# Patient Record
Sex: FEMALE | Race: WHITE | HISPANIC OR LATINO | Employment: FULL TIME | URBAN - METROPOLITAN AREA
[De-identification: names, ages, dates, MRNs, and addresses within clinical notes are randomized per-mention and may not be internally consistent; named-entity substitution may affect disease eponyms.]

---

## 2023-06-20 LAB
ERYTHROCYTE DISTRIBUTION WIDTH (RATIO) BY AUTOMATED COUNT: 14 % (ref 11.5–14.5)
ERYTHROCYTE MEAN CORPUSCULAR HEMOGLOBIN CONCENTRATION (G/DL) BY AUTOMATED: 31.3 G/DL (ref 32–36)
ERYTHROCYTE MEAN CORPUSCULAR VOLUME (FL) BY AUTOMATED COUNT: 90 FL (ref 80–100)
ERYTHROCYTES (10*6/UL) IN BLOOD BY AUTOMATED COUNT: 4.21 X10E12/L (ref 4–5.2)
GLUCOSE, 1 HR SCREEN, PREG: 109 MG/DL
HEMATOCRIT (%) IN BLOOD BY AUTOMATED COUNT: 38 % (ref 36–46)
HEMOGLOBIN (G/DL) IN BLOOD: 11.9 G/DL (ref 12–16)
LEUKOCYTES (10*3/UL) IN BLOOD BY AUTOMATED COUNT: 9.2 X10E9/L (ref 4.4–11.3)
PLATELETS (10*3/UL) IN BLOOD AUTOMATED COUNT: 210 X10E9/L (ref 150–450)
REFLEX ADDED, ANEMIA PANEL: ABNORMAL

## 2023-07-08 ENCOUNTER — HOSPITAL ENCOUNTER (OUTPATIENT)
Dept: DATA CONVERSION | Facility: HOSPITAL | Age: 33
End: 2023-07-08
Attending: OBSTETRICS & GYNECOLOGY
Payer: COMMERCIAL

## 2023-07-08 DIAGNOSIS — O99.891 OTHER SPECIFIED DISEASES AND CONDITIONS COMPLICATING PREGNANCY (HHS-HCC): ICD-10-CM

## 2023-07-08 DIAGNOSIS — O26.893 OTHER SPECIFIED PREGNANCY RELATED CONDITIONS, THIRD TRIMESTER (HHS-HCC): ICD-10-CM

## 2023-07-08 DIAGNOSIS — M79.10 MYALGIA, UNSPECIFIED SITE: ICD-10-CM

## 2023-07-08 DIAGNOSIS — Z3A.32 32 WEEKS GESTATION OF PREGNANCY (HHS-HCC): ICD-10-CM

## 2023-07-08 DIAGNOSIS — R51.9 HEADACHE, UNSPECIFIED: ICD-10-CM

## 2023-08-04 LAB — GROUP B STREP SCREEN: NORMAL

## 2023-09-28 PROBLEM — M79.10 MYALGIA: Status: ACTIVE | Noted: 2023-09-28

## 2023-09-28 PROBLEM — M99.04 SEGMENTAL AND SOMATIC DYSFUNCTION OF SACRAL REGION: Status: ACTIVE | Noted: 2023-09-28

## 2023-09-28 PROBLEM — M53.3 SACRAL BACK PAIN: Status: ACTIVE | Noted: 2023-09-28

## 2023-09-28 PROBLEM — M99.02 SEGMENTAL AND SOMATIC DYSFUNCTION OF THORACIC REGION: Status: ACTIVE | Noted: 2023-09-28

## 2023-09-28 PROBLEM — O99.891 BACK PAIN IN PREGNANCY (HHS-HCC): Status: ACTIVE | Noted: 2023-09-28

## 2023-09-28 PROBLEM — R35.0 URINARY FREQUENCY: Status: ACTIVE | Noted: 2023-09-28

## 2023-09-28 PROBLEM — M54.9 BACK PAIN IN PREGNANCY (HHS-HCC): Status: ACTIVE | Noted: 2023-09-28

## 2023-09-28 PROBLEM — M99.03 SEGMENTAL AND SOMATIC DYSFUNCTION OF LUMBAR REGION: Status: ACTIVE | Noted: 2023-09-28

## 2023-09-28 PROBLEM — Z20.2 POSSIBLE EXPOSURE TO STD: Status: ACTIVE | Noted: 2023-09-28

## 2023-09-28 PROBLEM — E03.9 HYPOTHYROIDISM: Status: ACTIVE | Noted: 2023-09-28

## 2023-09-28 PROBLEM — M99.05 SEGMENTAL AND SOMATIC DYSFUNCTION OF PELVIC REGION: Status: ACTIVE | Noted: 2023-09-28

## 2023-09-28 PROBLEM — M99.01 SEGMENTAL AND SOMATIC DYSFUNCTION OF CERVICAL REGION: Status: ACTIVE | Noted: 2023-09-28

## 2023-09-28 PROBLEM — M79.9 POSTURAL STRAIN: Status: ACTIVE | Noted: 2023-09-28

## 2023-09-28 PROBLEM — F32.A DEPRESSION: Status: ACTIVE | Noted: 2023-09-28

## 2023-09-28 RX ORDER — LEVOTHYROXINE SODIUM 88 UG/1
88 TABLET ORAL DAILY
COMMUNITY
Start: 2022-12-07

## 2023-09-28 RX ORDER — LEVOTHYROXINE SODIUM 125 UG/1
125 TABLET ORAL DAILY
COMMUNITY
Start: 2023-05-13 | End: 2023-10-02 | Stop reason: ALTCHOICE

## 2023-09-28 RX ORDER — VIT C/E/ZN/COPPR/LUTEIN/ZEAXAN 250MG-90MG
25 CAPSULE ORAL DAILY
COMMUNITY
End: 2024-01-10 | Stop reason: WASHOUT

## 2023-09-28 RX ORDER — ACETAMINOPHEN 325 MG/1
975 TABLET ORAL EVERY 6 HOURS
COMMUNITY
Start: 2021-05-27 | End: 2023-10-02 | Stop reason: HOSPADM

## 2023-09-28 RX ORDER — ASCORBIC ACID 500 MG
TABLET,CHEWABLE ORAL
COMMUNITY
End: 2024-01-10 | Stop reason: WASHOUT

## 2023-09-28 RX ORDER — DOCUSATE SODIUM 100 MG/1
100 CAPSULE, LIQUID FILLED ORAL 2 TIMES DAILY
COMMUNITY
Start: 2021-05-27 | End: 2023-10-02 | Stop reason: ENTERED-IN-ERROR

## 2023-09-28 RX ORDER — LEVOTHYROXINE SODIUM 75 UG/1
1 TABLET ORAL DAILY
COMMUNITY
End: 2023-10-02 | Stop reason: ALTCHOICE

## 2023-09-28 RX ORDER — LEVOTHYROXINE SODIUM 75 UG/1
0.5 TABLET ORAL
COMMUNITY
Start: 2020-10-13 | End: 2023-10-02 | Stop reason: ALTCHOICE

## 2023-09-28 RX ORDER — IBUPROFEN 600 MG/1
600 TABLET ORAL EVERY 6 HOURS
COMMUNITY
Start: 2021-05-27 | End: 2023-10-02 | Stop reason: HOSPADM

## 2023-09-29 VITALS — BODY MASS INDEX: 40.51 KG/M2 | HEIGHT: 63 IN | WEIGHT: 228.62 LBS

## 2023-10-02 ENCOUNTER — OFFICE VISIT (OUTPATIENT)
Dept: OBSTETRICS AND GYNECOLOGY | Facility: CLINIC | Age: 33
End: 2023-10-02
Payer: COMMERCIAL

## 2023-10-02 VITALS
WEIGHT: 205 LBS | DIASTOLIC BLOOD PRESSURE: 70 MMHG | HEIGHT: 64 IN | SYSTOLIC BLOOD PRESSURE: 122 MMHG | BODY MASS INDEX: 35 KG/M2

## 2023-10-02 PROBLEM — O99.891 BACK PAIN IN PREGNANCY (HHS-HCC): Status: RESOLVED | Noted: 2023-09-28 | Resolved: 2023-10-02

## 2023-10-02 PROBLEM — M54.9 BACK PAIN IN PREGNANCY (HHS-HCC): Status: RESOLVED | Noted: 2023-09-28 | Resolved: 2023-10-02

## 2023-10-02 PROCEDURE — 88141 CYTOPATH C/V INTERPRET: CPT | Performed by: PATHOLOGY

## 2023-10-02 PROCEDURE — 0503F POSTPARTUM CARE VISIT: CPT | Performed by: ADVANCED PRACTICE MIDWIFE

## 2023-10-02 PROCEDURE — 88175 CYTOPATH C/V AUTO FLUID REDO: CPT

## 2023-10-02 PROCEDURE — 1036F TOBACCO NON-USER: CPT | Performed by: ADVANCED PRACTICE MIDWIFE

## 2023-10-02 ASSESSMENT — ENCOUNTER SYMPTOMS
RESPIRATORY NEGATIVE: 0
EYES NEGATIVE: 0
PSYCHIATRIC NEGATIVE: 0
GASTROINTESTINAL NEGATIVE: 0
HEMATOLOGIC/LYMPHATIC NEGATIVE: 0
MUSCULOSKELETAL NEGATIVE: 0
CONSTITUTIONAL NEGATIVE: 0
NEUROLOGICAL NEGATIVE: 0
ENDOCRINE NEGATIVE: 0
ALLERGIC/IMMUNOLOGIC NEGATIVE: 0
CARDIOVASCULAR NEGATIVE: 0

## 2023-10-02 NOTE — PROGRESS NOTES
----- Message from [place]ana [place] sent at 3/29/2023 10:09 AM CDT -----  Regarding: Sooner Appt Request  Who Is Calling : FRANKEL, ELIZABETH [[id number]]        Reason For The Call: Patient is requesting a sooner appointment.  Patient declined first available and does not want to be added to the waitlist.  Please contact the patient to schedule.        Preferred Contact Method: [phone]        Additional Information: Patient believes she has an UTI. First available was 5/24     Postpartum Progress Note    Assessment/Plan   Liliam Vargas is a 33 y.o.,  who presents for a 6 wk PP exam after a successful  2023. Breast feeding and supplementing with formula. Lochia gone, no period yet. Had intercourse without any concerns. Undecided on contraception, does not want hormones.     Active Problems:  There are no active Hospital Problems.    Problem List       No episode was linked to this visit.          Hospital course: gestational hypertension       Subjective   She reports no breast or nursing problems  She denies emotional concerns today   Her plan for contraception is IUD, periodic abstinence       Objective   Allergies:   Patient has no known allergies.         Last Vitals:  Temp Pulse Resp BP MAP Pulse Ox         122/70             Physical Exam:  General: Examination reveals a well developed, well nourished, female, in no acute distress. She is alert and cooperative.    Lab Data:  Labs in chart were reviewed.

## 2023-10-18 LAB
CYTOLOGY CMNT CVX/VAG CYTO-IMP: NORMAL
HPV HR GENOTYPES PNL CVX NAA+PROBE: NEGATIVE
HPV HR GENOTYPES PNL CVX NAA+PROBE: NEGATIVE
HPV16 DNA SPEC QL NAA+PROBE: NEGATIVE
HPV18 DNA SPEC QL NAA+PROBE: NEGATIVE
LAB AP HPV GENOTYPE QUESTION: YES
LAB AP HPV HR: NORMAL
LAB AP PREVIOUS ABNORMAL HISTORY: NORMAL
LABORATORY COMMENT REPORT: NORMAL
MENSTRUAL HX REPORTED: NORMAL
PATH REPORT.TOTAL CANCER: NORMAL

## 2024-01-10 ENCOUNTER — OFFICE VISIT (OUTPATIENT)
Dept: PRIMARY CARE | Facility: CLINIC | Age: 34
End: 2024-01-10
Payer: COMMERCIAL

## 2024-01-10 VITALS
BODY MASS INDEX: 35 KG/M2 | HEIGHT: 64 IN | TEMPERATURE: 97.4 F | WEIGHT: 205 LBS | HEART RATE: 68 BPM | OXYGEN SATURATION: 97 % | SYSTOLIC BLOOD PRESSURE: 108 MMHG | DIASTOLIC BLOOD PRESSURE: 68 MMHG

## 2024-01-10 DIAGNOSIS — E03.9 HYPOTHYROIDISM, UNSPECIFIED TYPE: Primary | ICD-10-CM

## 2024-01-10 DIAGNOSIS — R74.8 ELEVATED ALKALINE PHOSPHATASE LEVEL: ICD-10-CM

## 2024-01-10 DIAGNOSIS — E28.2 PCOS (POLYCYSTIC OVARIAN SYNDROME): ICD-10-CM

## 2024-01-10 DIAGNOSIS — Z13.220 SCREENING, LIPID: ICD-10-CM

## 2024-01-10 DIAGNOSIS — H66.91 OTITIS OF RIGHT EAR: ICD-10-CM

## 2024-01-10 PROBLEM — E04.9 GOITER: Status: ACTIVE | Noted: 2024-01-10

## 2024-01-10 PROBLEM — R35.0 URINARY FREQUENCY: Status: RESOLVED | Noted: 2023-09-28 | Resolved: 2024-01-10

## 2024-01-10 PROBLEM — M99.04 SEGMENTAL AND SOMATIC DYSFUNCTION OF SACRAL REGION: Status: RESOLVED | Noted: 2023-09-28 | Resolved: 2024-01-10

## 2024-01-10 PROBLEM — M79.10 MYALGIA: Status: RESOLVED | Noted: 2023-09-28 | Resolved: 2024-01-10

## 2024-01-10 PROBLEM — M99.05 SEGMENTAL AND SOMATIC DYSFUNCTION OF PELVIC REGION: Status: RESOLVED | Noted: 2023-09-28 | Resolved: 2024-01-10

## 2024-01-10 PROBLEM — M99.02 SEGMENTAL AND SOMATIC DYSFUNCTION OF THORACIC REGION: Status: RESOLVED | Noted: 2023-09-28 | Resolved: 2024-01-10

## 2024-01-10 PROBLEM — M99.01 SEGMENTAL AND SOMATIC DYSFUNCTION OF CERVICAL REGION: Status: RESOLVED | Noted: 2023-09-28 | Resolved: 2024-01-10

## 2024-01-10 PROBLEM — M79.9 POSTURAL STRAIN: Status: RESOLVED | Noted: 2023-09-28 | Resolved: 2024-01-10

## 2024-01-10 PROBLEM — F32.A DEPRESSION: Status: RESOLVED | Noted: 2023-09-28 | Resolved: 2024-01-10

## 2024-01-10 PROBLEM — Z20.2 POSSIBLE EXPOSURE TO STD: Status: RESOLVED | Noted: 2023-09-28 | Resolved: 2024-01-10

## 2024-01-10 PROBLEM — M99.03 SEGMENTAL AND SOMATIC DYSFUNCTION OF LUMBAR REGION: Status: RESOLVED | Noted: 2023-09-28 | Resolved: 2024-01-10

## 2024-01-10 PROBLEM — M53.3 SACRAL BACK PAIN: Status: RESOLVED | Noted: 2023-09-28 | Resolved: 2024-01-10

## 2024-01-10 PROCEDURE — 99203 OFFICE O/P NEW LOW 30 MIN: CPT | Performed by: INTERNAL MEDICINE

## 2024-01-10 PROCEDURE — 1036F TOBACCO NON-USER: CPT | Performed by: INTERNAL MEDICINE

## 2024-01-10 RX ORDER — AMOXICILLIN AND CLAVULANATE POTASSIUM 875; 125 MG/1; MG/1
1 TABLET, FILM COATED ORAL 2 TIMES DAILY
Qty: 8 TABLET | Refills: 0 | Status: SHIPPED | OUTPATIENT
Start: 2024-01-10 | End: 2024-01-14

## 2024-01-10 RX ORDER — AMOXICILLIN AND CLAVULANATE POTASSIUM 875; 125 MG/1; MG/1
1 TABLET, FILM COATED ORAL 2 TIMES DAILY
COMMUNITY
Start: 2024-01-02 | End: 2024-01-10 | Stop reason: SDUPTHER

## 2024-01-10 SDOH — HEALTH STABILITY: PHYSICAL HEALTH: ON AVERAGE, HOW MANY MINUTES DO YOU ENGAGE IN EXERCISE AT THIS LEVEL?: 30 MIN

## 2024-01-10 SDOH — ECONOMIC STABILITY: TRANSPORTATION INSECURITY
IN THE PAST 12 MONTHS, HAS LACK OF TRANSPORTATION KEPT YOU FROM MEETINGS, WORK, OR FROM GETTING THINGS NEEDED FOR DAILY LIVING?: NO

## 2024-01-10 SDOH — HEALTH STABILITY: PHYSICAL HEALTH: ON AVERAGE, HOW MANY DAYS PER WEEK DO YOU ENGAGE IN MODERATE TO STRENUOUS EXERCISE (LIKE A BRISK WALK)?: 7 DAYS

## 2024-01-10 SDOH — ECONOMIC STABILITY: TRANSPORTATION INSECURITY
IN THE PAST 12 MONTHS, HAS THE LACK OF TRANSPORTATION KEPT YOU FROM MEDICAL APPOINTMENTS OR FROM GETTING MEDICATIONS?: NO

## 2024-01-10 ASSESSMENT — LIFESTYLE VARIABLES
AUDIT-C TOTAL SCORE: 0
HOW OFTEN DO YOU HAVE A DRINK CONTAINING ALCOHOL: NEVER
SKIP TO QUESTIONS 9-10: 1
HOW MANY STANDARD DRINKS CONTAINING ALCOHOL DO YOU HAVE ON A TYPICAL DAY: PATIENT DOES NOT DRINK
HOW OFTEN DO YOU HAVE SIX OR MORE DRINKS ON ONE OCCASION: NEVER

## 2024-01-10 ASSESSMENT — SOCIAL DETERMINANTS OF HEALTH (SDOH)
WITHIN THE LAST YEAR, HAVE YOU BEEN HUMILIATED OR EMOTIONALLY ABUSED IN OTHER WAYS BY YOUR PARTNER OR EX-PARTNER?: NO
WITHIN THE LAST YEAR, HAVE YOU BEEN KICKED, HIT, SLAPPED, OR OTHERWISE PHYSICALLY HURT BY YOUR PARTNER OR EX-PARTNER?: NO
WITHIN THE LAST YEAR, HAVE YOU BEEN AFRAID OF YOUR PARTNER OR EX-PARTNER?: NO
WITHIN THE LAST YEAR, HAVE TO BEEN RAPED OR FORCED TO HAVE ANY KIND OF SEXUAL ACTIVITY BY YOUR PARTNER OR EX-PARTNER?: NO

## 2024-01-10 ASSESSMENT — PATIENT HEALTH QUESTIONNAIRE - PHQ9
2. FEELING DOWN, DEPRESSED OR HOPELESS: NOT AT ALL
1. LITTLE INTEREST OR PLEASURE IN DOING THINGS: NOT AT ALL
SUM OF ALL RESPONSES TO PHQ9 QUESTIONS 1 & 2: 0

## 2024-01-10 ASSESSMENT — PAIN SCALES - GENERAL: PAINLEVEL: 6

## 2024-01-10 NOTE — PROGRESS NOTES
"Standard Progress Note  Chief Complaint   Patient presents with    New Patient Visit     Pt here for new pt visit to establish with Dr. Jensen.  Pt states \"I have been on an antibiotic for an ear infection and tomorrow is the last day.  My right ear is still clogged.\"       HPI:  Liliam Vargas 33 y.o.   female  Right ear still hurts. Gradual improvement day 9 of antibiotic augmentin. [facility]  Sometimes clear fluid left ear. Not recently  Tubes ears in the past.  Has endocrinologist and gynecologist. Did not have primary before      Patient Active Problem List   Diagnosis    Hypothyroidism    PCOS (polycystic ovarian syndrome)    Goiter     Reviewed social, family and surgical history  Allergies and meds reviewed.           Blood pressure 108/68, pulse 68, temperature 36.3 °C (97.4 °F), height 1.626 m (5' 4\"), weight 93 kg (205 lb), SpO2 97 %, currently breastfeeding.  Body mass index is 35.19 kg/m².          Vital reviewed  Left ear scarring  Right ear scarring and TM cloudy  Throat no exudate  Eyes EOMI  Neck: no cervical/clavicular adenopathy +GOITER  CV: RRR S1 S2 normal. No murmur. No carotid bruit.   Lungs: CTA without wrr. Breath sounds symmetric  Abdomen: normoactive. Soft, nontender. No mass.  Extremities: no pretibial edema  Neuro: speech intact.   Scar lower abdomen, horizontal        Lab Results   Component Value Date    GLUCOSE 160 (H) 08/24/2023    CALCIUM 9.0 08/24/2023     08/24/2023    K 3.8 08/24/2023    CO2 21 08/24/2023     08/24/2023    BUN 9 08/24/2023    CREATININE 0.56 08/24/2023      Lab Results   Component Value Date    WBC 10.3 08/22/2023    HGB 13.3 08/22/2023    HCT 41.3 08/22/2023    MCV 86 08/22/2023     08/22/2023      No results found for: \"CHOL\"  No results found for: \"HDL\"  No results found for: \"LDLCALC\"  No results found for: \"TRIG\"  No components found for: \"CHOLHDL\"    1. Hypothyroidism, unspecified type  Managed by  endocrinologist    2. PCOS " (polycystic ovarian syndrome)  Managed by endocrinologist    3. Elevated alkaline phosphatase level  recheck  - Comprehensive metabolic panel; Future    4. Screening, lipid    - Lipid Panel; Future    5. Otitis of right ear  Extend antibiotic for total 14 days. Rtc if refractory. Also advised to see ENT  - amoxicillin-pot clavulanate (Augmentin) 875-125 mg tablet; Take 1 tablet (875 mg) by mouth 2 times a day for 4 days.  Dispense: 8 tablet; Refill: 0  - Referral to ENT; Future        Current Outpatient Medications on File Prior to Visit   Medication Sig Dispense Refill    levothyroxine (Synthroid, Levoxyl) 88 mcg tablet Take 1 tablet (88 mcg) by mouth once daily.      multivit-min/ferrous fumarate (MULTI VITAMIN ORAL) Take by mouth once daily.      [DISCONTINUED] amoxicillin-pot clavulanate (Augmentin) 875-125 mg tablet Take 1 tablet (875 mg) by mouth twice a day.      [DISCONTINUED] cholecalciferol (Vitamin D-3) 25 MCG (1000 UT) capsule Take 1 capsule (25 mcg) by mouth once daily.      [DISCONTINUED] ascorbic acid (Vitamin C) 500 mg chewable tablet Chew.       No current facility-administered medications on file prior to visit.         Kathy Jensen MD

## 2024-01-12 ENCOUNTER — APPOINTMENT (OUTPATIENT)
Dept: OTOLARYNGOLOGY | Facility: CLINIC | Age: 34
End: 2024-01-12
Payer: COMMERCIAL

## 2024-01-13 ENCOUNTER — LAB (OUTPATIENT)
Dept: LAB | Facility: LAB | Age: 34
End: 2024-01-13
Payer: COMMERCIAL

## 2024-01-13 DIAGNOSIS — R74.8 ELEVATED ALKALINE PHOSPHATASE LEVEL: ICD-10-CM

## 2024-01-13 DIAGNOSIS — Z13.220 SCREENING, LIPID: ICD-10-CM

## 2024-01-13 LAB
ALBUMIN SERPL BCP-MCNC: 4.5 G/DL (ref 3.4–5)
ALP SERPL-CCNC: 91 U/L (ref 33–110)
ALT SERPL W P-5'-P-CCNC: 26 U/L (ref 7–45)
ANION GAP SERPL CALC-SCNC: 11 MMOL/L (ref 10–20)
AST SERPL W P-5'-P-CCNC: 27 U/L (ref 9–39)
BILIRUB SERPL-MCNC: 0.4 MG/DL (ref 0–1.2)
BUN SERPL-MCNC: 15 MG/DL (ref 6–23)
CALCIUM SERPL-MCNC: 9.5 MG/DL (ref 8.6–10.3)
CHLORIDE SERPL-SCNC: 99 MMOL/L (ref 98–107)
CHOLEST SERPL-MCNC: 208 MG/DL (ref 0–199)
CHOLESTEROL/HDL RATIO: 4.5
CO2 SERPL-SCNC: 29 MMOL/L (ref 21–32)
CREAT SERPL-MCNC: 0.67 MG/DL (ref 0.5–1.05)
EGFRCR SERPLBLD CKD-EPI 2021: >90 ML/MIN/1.73M*2
GLUCOSE SERPL-MCNC: 89 MG/DL (ref 74–99)
HDLC SERPL-MCNC: 46.6 MG/DL
LDLC SERPL CALC-MCNC: 147 MG/DL
NON HDL CHOLESTEROL: 161 MG/DL (ref 0–149)
POTASSIUM SERPL-SCNC: 4.4 MMOL/L (ref 3.5–5.3)
PROT SERPL-MCNC: 7.4 G/DL (ref 6.4–8.2)
SODIUM SERPL-SCNC: 135 MMOL/L (ref 136–145)
TRIGL SERPL-MCNC: 74 MG/DL (ref 0–149)
VLDL: 15 MG/DL (ref 0–40)

## 2024-01-13 PROCEDURE — 36415 COLL VENOUS BLD VENIPUNCTURE: CPT

## 2024-01-13 PROCEDURE — 80061 LIPID PANEL: CPT

## 2024-01-13 PROCEDURE — 80053 COMPREHEN METABOLIC PANEL: CPT

## 2024-03-27 ENCOUNTER — OFFICE VISIT (OUTPATIENT)
Dept: PRIMARY CARE | Facility: CLINIC | Age: 34
End: 2024-03-27
Payer: COMMERCIAL

## 2024-03-27 ENCOUNTER — LAB (OUTPATIENT)
Dept: LAB | Facility: LAB | Age: 34
End: 2024-03-27
Payer: COMMERCIAL

## 2024-03-27 VITALS
HEIGHT: 64 IN | DIASTOLIC BLOOD PRESSURE: 58 MMHG | OXYGEN SATURATION: 98 % | HEART RATE: 76 BPM | BODY MASS INDEX: 35.65 KG/M2 | WEIGHT: 208.8 LBS | SYSTOLIC BLOOD PRESSURE: 118 MMHG | TEMPERATURE: 97.8 F

## 2024-03-27 DIAGNOSIS — R61 NIGHT SWEATS: ICD-10-CM

## 2024-03-27 DIAGNOSIS — L71.9 ROSACEA: ICD-10-CM

## 2024-03-27 DIAGNOSIS — R61 NIGHT SWEATS: Primary | ICD-10-CM

## 2024-03-27 PROBLEM — E06.3 HYPOTHYROIDISM DUE TO HASHIMOTO'S THYROIDITIS: Status: ACTIVE | Noted: 2017-12-04

## 2024-03-27 PROBLEM — R51.9 HEADACHE: Status: ACTIVE | Noted: 2023-07-08

## 2024-03-27 PROBLEM — E55.9 VITAMIN D DEFICIENCY: Status: ACTIVE | Noted: 2022-07-11

## 2024-03-27 PROBLEM — E03.8 HYPOTHYROIDISM DUE TO HASHIMOTO'S THYROIDITIS: Status: ACTIVE | Noted: 2017-12-04

## 2024-03-27 PROBLEM — R74.8 HIGH ALKALINE PHOSPHATASE: Status: ACTIVE | Noted: 2024-03-27

## 2024-03-27 LAB
BASOPHILS # BLD AUTO: 0.06 X10*3/UL (ref 0–0.1)
BASOPHILS NFR BLD AUTO: 0.8 %
EOSINOPHIL # BLD AUTO: 0.2 X10*3/UL (ref 0–0.7)
EOSINOPHIL NFR BLD AUTO: 2.6 %
ERYTHROCYTE [DISTWIDTH] IN BLOOD BY AUTOMATED COUNT: 13.1 % (ref 11.5–14.5)
FSH SERPL-ACNC: 6.1 IU/L
HCT VFR BLD AUTO: 40.5 % (ref 36–46)
HGB BLD-MCNC: 12.8 G/DL (ref 12–16)
IMM GRANULOCYTES # BLD AUTO: 0.03 X10*3/UL (ref 0–0.7)
IMM GRANULOCYTES NFR BLD AUTO: 0.4 % (ref 0–0.9)
LYMPHOCYTES # BLD AUTO: 2.85 X10*3/UL (ref 1.2–4.8)
LYMPHOCYTES NFR BLD AUTO: 36.4 %
MCH RBC QN AUTO: 27.7 PG (ref 26–34)
MCHC RBC AUTO-ENTMCNC: 31.6 G/DL (ref 32–36)
MCV RBC AUTO: 88 FL (ref 80–100)
MONOCYTES # BLD AUTO: 0.62 X10*3/UL (ref 0.1–1)
MONOCYTES NFR BLD AUTO: 7.9 %
NEUTROPHILS # BLD AUTO: 4.07 X10*3/UL (ref 1.2–7.7)
NEUTROPHILS NFR BLD AUTO: 51.9 %
NRBC BLD-RTO: 0 /100 WBCS (ref 0–0)
PLATELET # BLD AUTO: 277 X10*3/UL (ref 150–450)
RBC # BLD AUTO: 4.62 X10*6/UL (ref 4–5.2)
WBC # BLD AUTO: 7.8 X10*3/UL (ref 4.4–11.3)

## 2024-03-27 PROCEDURE — 99213 OFFICE O/P EST LOW 20 MIN: CPT | Performed by: INTERNAL MEDICINE

## 2024-03-27 PROCEDURE — 1036F TOBACCO NON-USER: CPT | Performed by: INTERNAL MEDICINE

## 2024-03-27 PROCEDURE — 83001 ASSAY OF GONADOTROPIN (FSH): CPT

## 2024-03-27 PROCEDURE — 85025 COMPLETE CBC W/AUTO DIFF WBC: CPT

## 2024-03-27 PROCEDURE — 36415 COLL VENOUS BLD VENIPUNCTURE: CPT

## 2024-03-27 RX ORDER — METRONIDAZOLE 7.5 MG/G
GEL TOPICAL 2 TIMES DAILY
Qty: 45 G | Refills: 2 | Status: SHIPPED | OUTPATIENT
Start: 2024-03-27 | End: 2025-03-27

## 2024-03-27 ASSESSMENT — PAIN SCALES - GENERAL: PAINLEVEL: 0-NO PAIN

## 2024-03-27 NOTE — PROGRESS NOTES
"Standard Progress Note  Chief Complaint   Patient presents with    Night Sweats     Pt here with c/o night sweats, onset 3-4 months.  Pt would also like to discuss flushed cheeks, states \"My mom had rosacea so I was wondering if I have that as well.\"       HPI:  Liliam Vargas 33 y.o.   female  Last visit 01/2024. Has endocrinologist and gyn. Endo wanted tsh mid 2 range in case she gets pregnant again. She is thinking of trying next year.   Periods always irregular q 28-40 days.   Night sweats 3-4 months.  Not drenching night sweats.   Always had night sweats  the week before her period.     Baby boy is 7 months old.     Cheeks are red. Mom has rosacea.     Patient Active Problem List   Diagnosis    Hypothyroidism    PCOS (polycystic ovarian syndrome)    Goiter    Headache    High alkaline phosphatase    Hypothyroidism due to Hashimoto's thyroiditis    Vitamin D deficiency       Blood pressure 118/58, pulse 76, temperature 36.6 °C (97.8 °F), height 1.626 m (5' 4\"), weight 94.7 kg (208 lb 12.8 oz), SpO2 98 %, currently breastfeeding.  Body mass index is 35.84 kg/m².  Redness increased vessel marking bilateral cheeks near nose.  No bumps noted  Neuro no facial asymmetry. Speech intact.       Lab Results   Component Value Date    GLUCOSE 89 01/13/2024    CALCIUM 9.5 01/13/2024     (L) 01/13/2024    K 4.4 01/13/2024    CO2 29 01/13/2024    CL 99 01/13/2024    BUN 15 01/13/2024    CREATININE 0.67 01/13/2024      Lab Results   Component Value Date    WBC 10.3 08/22/2023    HGB 13.3 08/22/2023    HCT 41.3 08/22/2023    MCV 86 08/22/2023     08/22/2023      Lab Results   Component Value Date    CHOL 208 (H) 01/13/2024     Lab Results   Component Value Date    HDL 46.6 01/13/2024     Lab Results   Component Value Date    LDLCALC 147 (H) 01/13/2024     Lab Results   Component Value Date    TRIG 74 01/13/2024     No components found for: \"CHOLHDL\"    1. Night sweats  - FSH; Future  - CBC and Auto Differential; " Future    2. Rosacea  - metroNIDAZOLE (Metrogel) 0.75 % gel; Apply topically 2 times a day. Apply twice daily to rosacea  Dispense: 45 g; Refill: 2      Current Outpatient Medications on File Prior to Visit   Medication Sig Dispense Refill    levothyroxine (Synthroid, Levoxyl) 88 mcg tablet Take 1 tablet (88 mcg) by mouth once daily.      multivit-min/ferrous fumarate (MULTI VITAMIN ORAL) Take by mouth once daily.       No current facility-administered medications on file prior to visit.         Kathy Jensen MD

## 2024-07-12 DIAGNOSIS — L71.9 ROSACEA: ICD-10-CM

## 2024-07-12 RX ORDER — METRONIDAZOLE 7.5 MG/G
GEL TOPICAL
Qty: 45 G | Refills: 11 | Status: SHIPPED | OUTPATIENT
Start: 2024-07-12

## 2024-07-12 RX ORDER — AZELAIC ACID 0.15 G/G
GEL TOPICAL
Qty: 60 G | Refills: 11 | Status: SHIPPED | OUTPATIENT
Start: 2024-07-12

## 2024-10-02 ENCOUNTER — OFFICE VISIT (OUTPATIENT)
Dept: URGENT CARE | Age: 34
End: 2024-10-02
Payer: COMMERCIAL

## 2024-10-02 VITALS
HEIGHT: 64 IN | SYSTOLIC BLOOD PRESSURE: 117 MMHG | BODY MASS INDEX: 34.15 KG/M2 | DIASTOLIC BLOOD PRESSURE: 60 MMHG | HEART RATE: 72 BPM | OXYGEN SATURATION: 96 % | RESPIRATION RATE: 16 BRPM | WEIGHT: 200 LBS | TEMPERATURE: 98.3 F

## 2024-10-02 DIAGNOSIS — H60.503 ACUTE NONINFECTIVE OTITIS EXTERNA OF BOTH EARS, UNSPECIFIED TYPE: Primary | ICD-10-CM

## 2024-10-02 RX ORDER — NEOMYCIN SULFATE, POLYMYXIN B SULFATE, HYDROCORTISONE 3.5; 10000; 1 MG/ML; [USP'U]/ML; MG/ML
3 SOLUTION/ DROPS AURICULAR (OTIC) 3 TIMES DAILY
Qty: 3.15 ML | Refills: 0 | Status: SHIPPED | OUTPATIENT
Start: 2024-10-02 | End: 2024-10-09

## 2024-10-02 ASSESSMENT — PAIN SCALES - GENERAL: PAINLEVEL: 5

## 2024-10-02 NOTE — PATIENT INSTRUCTIONS
You have a infection of your outer ear and ear canal  Antibiotics drops will be prescribed, use as directed even if you start feeling better. Use for at least one week.  For aches and pain, Tylenol, Advil, Motrin, ibuprofen can be used.  Avoid using Q-tips or other objects in ears, as this can worsen the infection.  When swimming or bathing, use a cotton ball to absorb the remaining water in your ear canal to prevent worsening of symptoms.

## 2024-10-02 NOTE — PROGRESS NOTES
"Subjective   Patient ID: Liliam Vargas is a 34 y.o. female. They present today with a chief complaint of Earache (Couple days /Left clogged with jaw pain and sinus headache).    Patient disposition: Home    History of Present Illness  HPI  Bilateral ear pain, feels clogged, radiating to the jaw and causing headache.  Symptoms started after wearing in ear headphones.  Swollen canal and mild discharge.  Taking ibuprofen.  No recent cold symptoms or congestion.  Right side more than left.  Feels itchy.  Past Medical History  Allergies as of 10/02/2024    (No Known Allergies)       (Not in a hospital admission)            reports that she has never smoked. She has never used smokeless tobacco.    Review of Systems  As noted in HPI. ROS otherwise negative unless noted.       Objective    Vitals:    10/02/24 1315   BP: 117/60   Pulse: 72   Resp: 16   Temp: 36.8 °C (98.3 °F)   SpO2: 96%   Weight: 90.7 kg (200 lb)   Height: 1.626 m (5' 4\")     Patient's last menstrual period was 09/08/2024 (exact date).    Physical Exam  Constitutional: vital signs reviewed. Well developed, well nourished. patient alert and patient without distress.   Head and Face: Normal and atraumatic.    Ears, Nose, Mouth, and Throat:   Hearing: Normal.  External inspection of nose: Normal.   Lips, teeth, tongue and gums: Normal and well hydrated. External inspection of ears: Normal. Ear canals and TMs: Bilateral canals, right more than left inflamed, TMs otherwise normal.    Neck: No neck mass was observed. Supple. normal muscle tone.   Cardiovascular: Heart rate normal, normal S1 and S2, no gallops, no murmurs and no pericardial rub. Rhythm: Normal.  Pulmonary: No respiratory distress. Palpation of chest: Normal. Clear bilateral breath sounds.   Lymphatic: No cervical lymphadenopathy  Psych: Normal mood and affect        Procedures    Point of Care Test & Imaging Results from this visit         Diagnostic study results (if any) were " reviewed.    Assessment/Plan   Allergies, medications, history, and pertinent labs/EKGs/Imaging reviewed.    Medical Decision Making  See note    Orders and Diagnoses  There are no diagnoses linked to this encounter.    Medical Admin Record      Follow Up Instructions  No follow-ups on file.        Electronically signed by [facility]

## 2025-03-23 PROBLEM — E06.3 HYPOTHYROIDISM DUE TO HASHIMOTO'S THYROIDITIS: Status: RESOLVED | Noted: 2017-12-04 | Resolved: 2025-03-23

## 2025-03-23 NOTE — PROGRESS NOTES
"Chief Complaint   Patient presents with    Annual Exam     Pt here for AWV       34 y.o. for AWV  Last visit 03/2025.   Has heel pain.   Right low back pain-does cat/cow exercises.  1 week before menses sensitive to temperature.   Stress urinary incontinence  Sees florencia for thyroid  Has gynecologist    Patient Active Problem List   Diagnosis    Hypothyroidism    PCOS (polycystic ovarian syndrome)    Goiter    Headache    High alkaline phosphatase    Vitamin D deficiency     Blood pressure 118/80, pulse 83, temperature 36.4 °C (97.5 °F), height 1.626 m (5' 4\"), weight 101 kg (222 lb 3.2 oz), SpO2 98%, not currently breastfeeding.      Physical Examination  General: NAD. Alert.   HEENT: Normocephalic atraumatic.    Eyes: no scleral icterus. Extraocular movements intact.  Pupils equal round and reactive to light.  Ears: TM intact.  No cerumen. Hearing grossly intact.   Throat: No exudate  Neck:  Supple. No thyroid goiter.  Lymph nodes: No cervical or clavicular adenopathy  Cardiovascular: Regular rate rhythm S1-S2 normal no murmur. No carotid bruit.   Lungs: Clear to Auscultation without wheezing, rales, or rhonchi, Breath sounds symmetric. No use of accessory muscles  Abdomen:  Normoactive bowel sounds, soft, non-tender. No mass.  No organomegaly  Extremities: No pretibial edema  Neuro: no facial asymmetry. Strength upper and lower extremities 5/5. Sensation intact to light touch. No tremor. Babinski negative  Skin: no rash noted  Vascular: DP pulses intact.   Breast: Both are symmetrical no nipple discharge.  No skin changes.  No mass palpated.  No axillary adenopathy.    Labs 02/2025 TSH low    Lab Results   Component Value Date    GLUCOSE 89 01/13/2024    CALCIUM 9.5 01/13/2024     (L) 01/13/2024    K 4.4 01/13/2024    CO2 29 01/13/2024    CL 99 01/13/2024    BUN 15 01/13/2024    CREATININE 0.67 01/13/2024     Lab Results   Component Value Date    ALT 26 01/13/2024    AST 27 01/13/2024    ALKPHOS 91 01/13/2024 " "   BILITOT 0.4 01/13/2024     Lab Results   Component Value Date    WBC 7.8 03/27/2024    HGB 12.8 03/27/2024    HCT 40.5 03/27/2024    MCV 88 03/27/2024     03/27/2024     Lab Results   Component Value Date    CHOL 208 (H) 01/13/2024     Lab Results   Component Value Date    HDL 46.6 01/13/2024     Lab Results   Component Value Date    LDLCALC 147 (H) 01/13/2024     Lab Results   Component Value Date    TRIG 74 01/13/2024     No components found for: \"CHOLHDL\"      ASSESSMENT/PLAN  AWV  Living Will: advised  Cognition/Memory if 65 or above: n/a  Hepatitis C screen (18-79) done 02/04/2023 negative  HIV screen(18-64, once) done 02/04/2023 negative  Mammogram start age 40  Pap 10/2023   Colon cancer screen: start age 45  Colon cancer screening 45 and older: n/a  Immunization: reviewed.    If Smoker/Former smoker:    Age 50-75, 20 pack year history, quit within 15 years or currently smoking  (medicare 55-77, 30 pack year) n/a    1. Routine general medical examination at a health care facility (Primary)  - Lipid Panel; Future  - Comprehensive Metabolic Panel; Future  - CBC; Future  - Lipid Panel  - Comprehensive Metabolic Panel  - CBC    2. Class 2 obesity with body mass index (BMI) of 38.0 to 38.9 in adult, unspecified obesity type, unspecified whether serious comorbidity present    3. Vitamin D deficiency  - Vitamin D 25-Hydroxy,Total (for eval of Vitamin D levels); Future  - Vitamin D 25-Hydroxy,Total (for eval of Vitamin D levels)    4. Screening, lipid    5. High alkaline phosphatase  - Comprehensive Metabolic Panel; Future  - CBC; Future  - Comprehensive Metabolic Panel  - CBC    6. Urinary incontinence, unspecified type  Discussed exercises  - Referral to Physical Therapy; Future    Pap ASCUS hpv negative. She will check with gyn on when to repeat.         Current Outpatient Medications on File Prior to Visit   Medication Sig Dispense Refill    azelaic acid (Finacea) 15 % gel Apply at night to areas of " rosacea 60 g 11    metroNIDAZOLE (Metrogel) 0.75 % gel Apply qam to rosacea 45 g 11    Synthroid 100 mcg tablet 1 tablet (100 mcg).  TAKE ONE TABLET MONDAY TO SATURDAY. TAKE 1/2 TABLET ON SUNDAY.      [DISCONTINUED] levothyroxine (Synthroid, Levoxyl) 88 mcg tablet Take 1 tablet (88 mcg) by mouth once daily. (Patient not taking: Reported on 3/24/2025)       No current facility-administered medications on file prior to visit.

## 2025-03-24 ENCOUNTER — APPOINTMENT (OUTPATIENT)
Dept: PRIMARY CARE | Facility: CLINIC | Age: 35
End: 2025-03-24
Payer: COMMERCIAL

## 2025-03-24 VITALS
DIASTOLIC BLOOD PRESSURE: 80 MMHG | HEART RATE: 83 BPM | WEIGHT: 222.2 LBS | BODY MASS INDEX: 37.94 KG/M2 | TEMPERATURE: 97.5 F | HEIGHT: 64 IN | OXYGEN SATURATION: 98 % | SYSTOLIC BLOOD PRESSURE: 118 MMHG

## 2025-03-24 DIAGNOSIS — E66.812 CLASS 2 OBESITY WITH BODY MASS INDEX (BMI) OF 38.0 TO 38.9 IN ADULT, UNSPECIFIED OBESITY TYPE, UNSPECIFIED WHETHER SERIOUS COMORBIDITY PRESENT: ICD-10-CM

## 2025-03-24 DIAGNOSIS — E55.9 VITAMIN D DEFICIENCY: ICD-10-CM

## 2025-03-24 DIAGNOSIS — Z13.220 SCREENING, LIPID: ICD-10-CM

## 2025-03-24 DIAGNOSIS — R74.8 HIGH ALKALINE PHOSPHATASE: ICD-10-CM

## 2025-03-24 DIAGNOSIS — Z00.00 ROUTINE GENERAL MEDICAL EXAMINATION AT A HEALTH CARE FACILITY: Primary | ICD-10-CM

## 2025-03-24 DIAGNOSIS — R32 URINARY INCONTINENCE, UNSPECIFIED TYPE: ICD-10-CM

## 2025-03-24 PROCEDURE — 99213 OFFICE O/P EST LOW 20 MIN: CPT | Performed by: INTERNAL MEDICINE

## 2025-03-24 PROCEDURE — 99395 PREV VISIT EST AGE 18-39: CPT | Performed by: INTERNAL MEDICINE

## 2025-03-24 PROCEDURE — 1036F TOBACCO NON-USER: CPT | Performed by: INTERNAL MEDICINE

## 2025-03-24 PROCEDURE — 3008F BODY MASS INDEX DOCD: CPT | Performed by: INTERNAL MEDICINE

## 2025-03-24 RX ORDER — LEVOTHYROXINE SODIUM 100 UG/1
100 TABLET ORAL
COMMUNITY
Start: 2025-02-20

## 2025-03-24 ASSESSMENT — PAIN SCALES - GENERAL: PAINLEVEL_OUTOF10: 6

## 2025-03-24 ASSESSMENT — LIFESTYLE VARIABLES
HOW MANY STANDARD DRINKS CONTAINING ALCOHOL DO YOU HAVE ON A TYPICAL DAY: PATIENT DOES NOT DRINK
HOW OFTEN DO YOU HAVE A DRINK CONTAINING ALCOHOL: NEVER
AUDIT-C TOTAL SCORE: 0
HOW OFTEN DO YOU HAVE SIX OR MORE DRINKS ON ONE OCCASION: NEVER
SKIP TO QUESTIONS 9-10: 1

## 2025-03-25 DIAGNOSIS — E55.9 VITAMIN D DEFICIENCY: Primary | ICD-10-CM

## 2025-03-25 LAB
25(OH)D3+25(OH)D2 SERPL-MCNC: 19 NG/ML (ref 30–100)
ALBUMIN SERPL-MCNC: 4.5 G/DL (ref 3.6–5.1)
ALP SERPL-CCNC: 80 U/L (ref 31–125)
ALT SERPL-CCNC: 19 U/L (ref 6–29)
ANION GAP SERPL CALCULATED.4IONS-SCNC: 8 MMOL/L (CALC) (ref 7–17)
AST SERPL-CCNC: 20 U/L (ref 10–30)
BILIRUB SERPL-MCNC: 0.4 MG/DL (ref 0.2–1.2)
BUN SERPL-MCNC: 17 MG/DL (ref 7–25)
CALCIUM SERPL-MCNC: 9.1 MG/DL (ref 8.6–10.2)
CHLORIDE SERPL-SCNC: 104 MMOL/L (ref 98–110)
CHOLEST SERPL-MCNC: 230 MG/DL
CHOLEST/HDLC SERPL: 4.6 (CALC)
CO2 SERPL-SCNC: 25 MMOL/L (ref 20–32)
CREAT SERPL-MCNC: 0.73 MG/DL (ref 0.5–0.97)
EGFRCR SERPLBLD CKD-EPI 2021: 112 ML/MIN/1.73M2
ERYTHROCYTE [DISTWIDTH] IN BLOOD BY AUTOMATED COUNT: 13 % (ref 11–15)
GLUCOSE SERPL-MCNC: 71 MG/DL (ref 65–99)
HCT VFR BLD AUTO: 41.5 % (ref 35–45)
HDLC SERPL-MCNC: 50 MG/DL
HGB BLD-MCNC: 13.2 G/DL (ref 11.7–15.5)
LDLC SERPL CALC-MCNC: 149 MG/DL (CALC)
MCH RBC QN AUTO: 26.1 PG (ref 27–33)
MCHC RBC AUTO-ENTMCNC: 31.8 G/DL (ref 32–36)
MCV RBC AUTO: 82 FL (ref 80–100)
NONHDLC SERPL-MCNC: 180 MG/DL (CALC)
PLATELET # BLD AUTO: 290 THOUSAND/UL (ref 140–400)
PMV BLD REES-ECKER: 10.8 FL (ref 7.5–12.5)
POTASSIUM SERPL-SCNC: 4.5 MMOL/L (ref 3.5–5.3)
PROT SERPL-MCNC: 7.3 G/DL (ref 6.1–8.1)
RBC # BLD AUTO: 5.06 MILLION/UL (ref 3.8–5.1)
SODIUM SERPL-SCNC: 137 MMOL/L (ref 135–146)
TRIGL SERPL-MCNC: 171 MG/DL
WBC # BLD AUTO: 8.9 THOUSAND/UL (ref 3.8–10.8)

## 2025-03-25 RX ORDER — ERGOCALCIFEROL 1.25 MG/1
CAPSULE ORAL
Qty: 8 CAPSULE | Refills: 0 | Status: SHIPPED | OUTPATIENT
Start: 2025-03-25

## 2025-06-01 ENCOUNTER — OFFICE VISIT (OUTPATIENT)
Dept: URGENT CARE | Age: 35
End: 2025-06-01
Payer: COMMERCIAL

## 2025-06-01 VITALS
RESPIRATION RATE: 20 BRPM | OXYGEN SATURATION: 99 % | SYSTOLIC BLOOD PRESSURE: 142 MMHG | HEART RATE: 87 BPM | DIASTOLIC BLOOD PRESSURE: 81 MMHG | TEMPERATURE: 98.4 F

## 2025-06-01 DIAGNOSIS — H65.02 NON-RECURRENT ACUTE SEROUS OTITIS MEDIA OF LEFT EAR: Primary | ICD-10-CM

## 2025-06-01 RX ORDER — AMOXICILLIN 875 MG/1
875 TABLET, COATED ORAL 2 TIMES DAILY
Qty: 20 TABLET | Refills: 0 | Status: SHIPPED | OUTPATIENT
Start: 2025-06-01 | End: 2025-06-11

## 2025-06-01 ASSESSMENT — ENCOUNTER SYMPTOMS
DIARRHEA: 0
CONSTITUTIONAL NEGATIVE: 1
RESPIRATORY NEGATIVE: 1
COUGH: 0
NEUROLOGICAL NEGATIVE: 1
CARDIOVASCULAR NEGATIVE: 1
VOMITING: 0
GASTROINTESTINAL NEGATIVE: 1
ABDOMINAL PAIN: 0
WHEEZING: 0
NAUSEA: 0
SHORTNESS OF BREATH: 0

## 2025-06-01 NOTE — PROGRESS NOTES
Subjective   Patient ID: Liliam Vargas is a 34 y.o. female. They present today with a chief complaint of Illness (Ear infection - Entered by patient) and Earache.    History of Present Illness  Ear Pain  Patient complains of ear pain and possible ear infection. Symptoms include left ear pain, plugged sensation in the left ear, and swollen glands. Onset of symptoms was 2 days ago, and have been gradually worsening since that time. Associated symptoms include: none. Patient denies: achiness, chills, fever , post nasal drip, sinus pressure, and sore throat. She is drinking moderate amounts of fluids.          History provided by:  Patient and medical records  Illness  Associated symptoms: ear pain    Associated symptoms: no abdominal pain, no chest pain, no cough, no diarrhea, no nausea, no shortness of breath, no vomiting and no wheezing    Earache   Pertinent negatives include no abdominal pain, coughing, diarrhea or vomiting.       Past Medical History  Allergies as of 06/01/2025    (No Known Allergies)       Prescriptions Prior to Admission[1]     Medical History[2]    Surgical History[3]     reports that she has never smoked. She has never used smokeless tobacco.    Review of Systems  Review of Systems   Constitutional: Negative.    HENT:  Positive for ear pain.    Respiratory: Negative.  Negative for cough, shortness of breath and wheezing.    Cardiovascular: Negative.  Negative for chest pain.   Gastrointestinal: Negative.  Negative for abdominal pain, diarrhea, nausea and vomiting.   Neurological: Negative.    All other systems reviewed and are negative.                                 Objective    Vitals:    06/01/25 1746   BP: 142/81   Pulse: 87   Resp: 20   Temp: 36.9 °C (98.4 °F)   TempSrc: Oral   SpO2: 99%     No LMP recorded.    Physical Exam  Vitals and nursing note reviewed.   Constitutional:       General: She is not in acute distress.     Appearance: Normal appearance. She is not ill-appearing.    HENT:      Head: Normocephalic and atraumatic.      Right Ear: Tympanic membrane normal.      Left Ear: Decreased hearing noted. Swelling and tenderness present. A middle ear effusion is present. Tympanic membrane is erythematous and bulging.      Ears:     Cardiovascular:      Rate and Rhythm: Normal rate and regular rhythm.   Pulmonary:      Effort: Pulmonary effort is normal.      Breath sounds: Normal breath sounds.   Abdominal:      General: Bowel sounds are normal.      Palpations: Abdomen is soft.   Musculoskeletal:      Cervical back: Normal range of motion and neck supple.   Skin:     General: Skin is warm and dry.      Capillary Refill: Capillary refill takes less than 2 seconds.   Neurological:      Mental Status: She is alert and oriented to person, place, and time.   Psychiatric:         Behavior: Behavior normal.         Procedures    Point of Care Test & Imaging Results from this visit  No results found for this visit on 06/01/25.   Imaging  No results found.    Cardiology, Vascular, and Other Imaging  No other imaging results found for the past 2 days      Diagnostic study results (if any) were reviewed by DELVIS Patel.    Assessment/Plan   Allergies, medications, history, and pertinent labs/EKGs/Imaging reviewed by DELVIS Patel.     Medical Decision Making  Risks, benefits, and alternatives of the medications and treatment plan prescribed today were discussed, and patient expressed understanding. Plan follow up as discussed or as needed if any worsening symptoms or change in condition. Reinforced red flags including (but not limited to): severe or worsening pain; difficulty swallowing; stiff neck; shortness of breath; coughing or vomiting blood; chest pain; and new or increased fever are indications to go to the Emergency Department.  At time of discharge patient was clinically well-appearing and HDS for outpatient management. The patient and/or family was educated  regarding diagnosis, supportive care, OTC and Rx medications. The patient and/or family was given the opportunity to ask questions prior to discharge.  They verbalized understanding of my discussion of the plans for treatment, expected course, indications to return to  or seek further evaluation in ED, and the need for timely follow up as directed.   They were provided with a work/school excuse if requested. The after-visit summary was given to the patient and care instructions were reviewed with the patient. All questions were answered and the patient verbalized understanding of the plan of care for today.  Plan:  Recent visit notes reviewed  Meds as above  Increase clear fluids  Pcp follow up this week if not improving or worsening  ER visit anytime  for acute worsening or changing condition      Orders and Diagnoses  Diagnoses and all orders for this visit:  Non-recurrent acute serous otitis media of left ear  -     amoxicillin (Amoxil) 875 mg tablet; Take 1 tablet (875 mg) by mouth 2 times a day for 10 days.      Medical Admin Record      Patient disposition: Home    Electronically signed by DELVIS Patel  6:23 PM           [1] (Not in a hospital admission)   [2]   Past Medical History:  Diagnosis Date    Hashimoto's disease    [3]   Past Surgical History:  Procedure Laterality Date    ADENOIDECTOMY       SECTION, LOW TRANSVERSE      EYE SURGERY

## 2025-06-04 ENCOUNTER — OFFICE VISIT (OUTPATIENT)
Dept: PRIMARY CARE | Facility: CLINIC | Age: 35
End: 2025-06-04
Payer: COMMERCIAL

## 2025-06-04 VITALS
TEMPERATURE: 98.1 F | HEART RATE: 82 BPM | BODY MASS INDEX: 37.83 KG/M2 | SYSTOLIC BLOOD PRESSURE: 118 MMHG | OXYGEN SATURATION: 96 % | WEIGHT: 221.6 LBS | HEIGHT: 64 IN | DIASTOLIC BLOOD PRESSURE: 68 MMHG

## 2025-06-04 DIAGNOSIS — H66.90 ACUTE OTITIS MEDIA, UNSPECIFIED OTITIS MEDIA TYPE: Primary | ICD-10-CM

## 2025-06-04 PROCEDURE — 99213 OFFICE O/P EST LOW 20 MIN: CPT | Performed by: INTERNAL MEDICINE

## 2025-06-04 PROCEDURE — 3008F BODY MASS INDEX DOCD: CPT | Performed by: INTERNAL MEDICINE

## 2025-06-04 RX ORDER — AMOXICILLIN AND CLAVULANATE POTASSIUM 875; 125 MG/1; MG/1
875 TABLET, FILM COATED ORAL 2 TIMES DAILY
Qty: 20 TABLET | Refills: 0 | Status: SHIPPED | OUTPATIENT
Start: 2025-06-04 | End: 2025-06-14

## 2025-06-04 RX ORDER — CHOLECALCIFEROL (VITAMIN D3) 50 MCG
50 TABLET ORAL DAILY
COMMUNITY

## 2025-06-04 ASSESSMENT — PATIENT HEALTH QUESTIONNAIRE - PHQ9
2. FEELING DOWN, DEPRESSED OR HOPELESS: NOT AT ALL
1. LITTLE INTEREST OR PLEASURE IN DOING THINGS: NOT AT ALL
SUM OF ALL RESPONSES TO PHQ9 QUESTIONS 1 AND 2: 0

## 2025-06-04 NOTE — PROGRESS NOTES
"Chief Complaint   Patient presents with    Otitis Media     Presents today for ear infection. Went to Urgent Care 6/1. Not improving.       34 y.o. woman  Last visit 03/2025.   Sees endo for thyroid  Has gynecologist    Pregnant.  Around 9-10 weeks.   Urgent care 06/01/2025 left ear pain few days. . Rx amoxicillin 875 mg bid x 10 days. No improvement yet. Not febrile but pus draining from left ear.     Patient Active Problem List   Diagnosis    Hypothyroidism    PCOS (polycystic ovarian syndrome)    Goiter    Headache    High alkaline phosphatase    Vitamin D deficiency    Class 2 obesity with body mass index (BMI) of 38.0 to 38.9 in adult     Physical Examination  HEENT n/c/AT.   Right ear negative  Left ear TM dark maroon color with white spots.   +tenderness posterior auricular  Cervical/clavicular lymph nodes not enlarged and non-tender  Sinuses non-tender      Labs 05/2025 TSH normal    Lab Results   Component Value Date    GLUCOSE 71 03/24/2025    CALCIUM 9.1 03/24/2025     03/24/2025    K 4.5 03/24/2025    CO2 25 03/24/2025     03/24/2025    BUN 17 03/24/2025    CREATININE 0.73 03/24/2025     Lab Results   Component Value Date    ALT 19 03/24/2025    AST 20 03/24/2025    ALKPHOS 80 03/24/2025    BILITOT 0.4 03/24/2025     Lab Results   Component Value Date    WBC 8.9 03/24/2025    HGB 13.2 03/24/2025    HCT 41.5 03/24/2025    MCV 82.0 03/24/2025     03/24/2025     Lab Results   Component Value Date    CHOL 230 (H) 03/24/2025    CHOL 208 (H) 01/13/2024     Lab Results   Component Value Date    HDL 50 03/24/2025    HDL 46.6 01/13/2024     Lab Results   Component Value Date    LDLCALC 149 (H) 03/24/2025    LDLCALC 147 (H) 01/13/2024     Lab Results   Component Value Date    TRIG 171 (H) 03/24/2025    TRIG 74 01/13/2024     No components found for: \"CHOLHDL\"      ASSESSMENT/PLAN  1. Acute otitis media, unspecified otitis media type (Primary)  Change amox to augmentin.  Reviewed limited data to " consider safety pregnancy.  Asked front staff to help patient arrange appointment with the ENT for this week as the ear looks quite inflamed and she has purulent drainage  - amoxicillin-clavulanate (Augmentin) 875-125 mg tablet; Take 1 tablet by mouth 2 times a day for 10 days.  Dispense: 20 tablet; Refill: 0  - Referral to ENT; Future    Living Will: advised  Hepatitis C screen (18-79) done 02/04/2023 negative  HIV screen(18-64, once) done 02/04/2023 negative  Mammogram start age 40  Pap 10/2023 ASCUS hpv negative. She will check with gyn on when to repeat.         Current Outpatient Medications on File Prior to Visit   Medication Sig Dispense Refill    amoxicillin (Amoxil) 875 mg tablet Take 1 tablet (875 mg) by mouth 2 times a day for 10 days. 20 tablet 0    azelaic acid (Finacea) 15 % gel Apply at night to areas of rosacea 60 g 11    cholecalciferol (Vitamin D-3) 50 mcg (2,000 units) tablet Take 1 tablet (50 mcg) by mouth once daily.      metroNIDAZOLE (Metrogel) 0.75 % gel Apply qam to rosacea 45 g 11    Synthroid 100 mcg tablet 1 tablet (100 mcg).  TAKE ONE TABLET MONDAY TO SATURDAY. TAKE 1/2 TABLET ON SUNDAY.      ergocalciferol (Vitamin D2) 1250 mcg (50,000 units) capsule 1 po weekly for 8 weeks. After completing take otc vitamin D 2000 units a day. (Patient not taking: Reported on 6/4/2025) 8 capsule 0     No current facility-administered medications on file prior to visit.

## 2025-06-05 ENCOUNTER — APPOINTMENT (OUTPATIENT)
Facility: CLINIC | Age: 35
End: 2025-06-05
Payer: COMMERCIAL

## 2025-06-10 ENCOUNTER — APPOINTMENT (OUTPATIENT)
Dept: OBSTETRICS AND GYNECOLOGY | Facility: CLINIC | Age: 35
End: 2025-06-10
Payer: COMMERCIAL

## 2025-06-10 VITALS — DIASTOLIC BLOOD PRESSURE: 77 MMHG | BODY MASS INDEX: 38.28 KG/M2 | WEIGHT: 223 LBS | SYSTOLIC BLOOD PRESSURE: 122 MMHG

## 2025-06-10 DIAGNOSIS — E66.89 OTHER OBESITY AFFECTING PREGNANCY IN FIRST TRIMESTER: ICD-10-CM

## 2025-06-10 DIAGNOSIS — E03.9 HYPOTHYROIDISM, UNSPECIFIED TYPE: ICD-10-CM

## 2025-06-10 DIAGNOSIS — O34.219 PREVIOUS CESAREAN SECTION COMPLICATING PREGNANCY (HHS-HCC): Primary | ICD-10-CM

## 2025-06-10 DIAGNOSIS — Z34.81 PRENATAL CARE, SUBSEQUENT PREGNANCY IN FIRST TRIMESTER: ICD-10-CM

## 2025-06-10 DIAGNOSIS — O99.211 OTHER OBESITY AFFECTING PREGNANCY IN FIRST TRIMESTER: ICD-10-CM

## 2025-06-10 DIAGNOSIS — Z3A.01 LESS THAN 8 WEEKS GESTATION OF PREGNANCY (HHS-HCC): ICD-10-CM

## 2025-06-10 PROBLEM — R51.9 HEADACHE: Status: RESOLVED | Noted: 2023-07-08 | Resolved: 2025-06-10

## 2025-06-10 PROBLEM — Z87.59 HISTORY OF GESTATIONAL HYPERTENSION: Status: ACTIVE | Noted: 2025-06-10

## 2025-06-10 LAB — PREGNANCY TEST URINE, POC: POSITIVE

## 2025-06-10 PROCEDURE — 81025 URINE PREGNANCY TEST: CPT | Performed by: ADVANCED PRACTICE MIDWIFE

## 2025-06-10 PROCEDURE — 0500F INITIAL PRENATAL CARE VISIT: CPT | Performed by: ADVANCED PRACTICE MIDWIFE

## 2025-06-10 RX ORDER — LEVOTHYROXINE SODIUM 125 UG/1
125 TABLET ORAL DAILY
Qty: 90 TABLET | Refills: 3 | Status: SHIPPED | OUTPATIENT
Start: 2025-06-10 | End: 2026-06-10

## 2025-06-10 RX ORDER — CIPROFLOXACIN AND DEXAMETHASONE 3; 1 MG/ML; MG/ML
SUSPENSION/ DROPS AURICULAR (OTIC)
COMMUNITY

## 2025-06-10 ASSESSMENT — EDINBURGH POSTNATAL DEPRESSION SCALE (EPDS)
I HAVE BEEN SO UNHAPPY THAT I HAVE HAD DIFFICULTY SLEEPING: YES, SOMETIMES
I HAVE FELT SCARED OR PANICKY FOR NO GOOD REASON: YES, SOMETIMES
THINGS HAVE BEEN GETTING ON TOP OF ME: YES, SOMETIMES I HAVEN'T BEEN COPING AS WELL AS USUAL
I HAVE LOOKED FORWARD WITH ENJOYMENT TO THINGS: AS MUCH AS I EVER DID
I HAVE BEEN ABLE TO LAUGH AND SEE THE FUNNY SIDE OF THINGS: AS MUCH AS I ALWAYS COULD
TOTAL SCORE: 12
I HAVE BEEN SO UNHAPPY THAT I HAVE BEEN CRYING: ONLY OCCASIONALLY
I HAVE BLAMED MYSELF UNNECESSARILY WHEN THINGS WENT WRONG: YES, SOME OF THE TIME
I HAVE BEEN ANXIOUS OR WORRIED FOR NO GOOD REASON: HARDLY EVER
THE THOUGHT OF HARMING MYSELF HAS OCCURRED TO ME: HARDLY EVER
I HAVE FELT SAD OR MISERABLE: NOT VERY OFTEN

## 2025-06-10 NOTE — PROGRESS NOTES
Initial Ob Visit  06/10/25    Mady Vargas is a 34 y.o.  at 8w2d with a working estimated date of delivery of 2026, by Last Menstrual Period who presents for an initial prenatal visit. This pregnancy is unplanned,  planning on waiting until next year, though, happy and accepting of this pregnancy.     Patient currently experiencing: nausea, declines anti-emetics, fatigue, heartburn  Bleeding or cramping since LMP: no  Taking prenatal vitamin: Yes  Ultrasound completed this pregnancy: No  Vaccinated for flu?: No  Up to date on Covid booster?: No    Last pap: 10/2023, ASCUS, plan to repeat at 6 week PPV    OB History    Para Term  AB Living   3 2 2 0 0 2   SAB IAB Ectopic Multiple Live Births   0 0 0  2      # Outcome Date GA Lbr [place]/2nd Weight Sex Type Anes PTL Lv   3 Current            2 Term 23 39w0d  3.6 kg M  EPI N MARQUIS   1 Term 21 39w0d  3.402 kg M CS-LTranv Spinal, EPI N MARQUIS      Complications: Fetal heart rate decelerations, delivered     [place]  Depression Scale Total: 12  Prior pregnancy complications: obesity, prior  section, thyroid dysfunction    History of hypertension:  Yes, gestational hypertension    Medical History[1]   Surgical History[2]     Objective   Visit Vitals  /77   Wt 101 kg (223 lb)   LMP 2025 (Approximate)   BMI 38.28 kg/m²   OB Status Pregnant   Smoking Status Never   BSA 2.14 m²     Physical Exam  Weight: 101 kg (223 lb)  TWG: 3.629 kg (8 lb)   Expected Total Weight Gain: 5 kg (11 lb)-9 kg (19 lb)   Pregravid BMI: 36.89  BP: 122/77    Review of Systems     Postpartum Depression: High Risk (6/10/2025)    [place]  Depression Scale     Last EPDS Total Score: 12     Last EPDS Self Harm Result: Hardly ever          Assessment   34 y.o.  at 8w2d     Problem List Items Addressed This Visit          Ob-Gyn Problems    Previous  section complicating pregnancy ([facility]) - Primary     Overview   Previous LTCS for fetal distress  Successful    MFMU score: 87.6%           Obesity affecting pregnancy in first trimester ([facility])    Overview   Pregravid BMI: 36.89  Weekly NSTs 37 weeks  Growth U/S at 30 & 36 weeks  IOL 39.0-39.6         Less than 8 weeks gestation of pregnancy ([facility])    Overview   Desired provider in labor: [x] CNM  [] Physician   [] Either Acceptable  [x] Blood Products: [x] Yes, accepts [] No, needs counseling  [x] Initial BMI: 36.89   [] Prenatal Labs:   [] Cervical Cancer Screening up to date  [] Rh status:   [] Screen for IPV and Substance Use Risk:  [] Genetic Screening (cfDNA):    [] First Trimester Anatomy Screen (11-13.6 wks):  [] Baby ASA (initiated):  [] Pregnancy dated by:     [] Anatomy US: (19-20 wks)  [] Federal Sterilization consent signed (if indicated):  [] 1hr GCT at 24-28wks:  [] Rhogam (if indicated):   [] Fetal Surveillance (if indicated):  [] Tdap (27-32 wks, may be given up to 36 wks if initial window missed):   [] RSV (32-36 wks) (Sept. to end ):     [] Feeding Intentions:  [] Postpartum Birth control method:   [] GBS at 36 - 37 wks:  [] 39 weeks discussion of IOL vs. Expectant management:  [] Mode of delivery ( anticipated ):              Other    Hypothyroidism    Overview   [facility], endocrinologist Dr Ana Fine  TSH drawn 6/3: 4.36  Synthroid increased at NOB visit to 125 mcg  TSH with NOB labs at 12 weeks  TSH every trimester  TSH 12 weeks:  TSH 24-28 weeks:  TSH 32-36 weeks:         Relevant Medications    Synthroid 125 mcg tablet     Other Visit Diagnoses         Prenatal care, subsequent pregnancy in first trimester        Relevant Orders    POCT pregnancy, urine manually resulted    Trichomonas vaginalis, Amplified    C. trachomatis / N. gonorrhoeae, Amplified, Urogenital    Urine culture    US MAC OB imaging order            Plan   - New OB resources provided and reviewed with particular attention to dietary, travel, and  medication restrictions  - Oriented to practice, CNM vs. MD care, planning CNM care  - Reviewed IOM recommendations for weight gain given pt's BMI: 11-20 pounds (BMI greater than or equal to 30)  - Reviewed bleeding precautions, warning signs, when to call provider; phone number provided  - Discussed bASA for PEC prophylaxis, will consider starting at 12 weeks, written literature provided.  - The following Rx were sent to pharmacy: N/A  - Routine NOB labs to be ordered at 12 weeks  - Additional labs added: TSH & HELLP labs  - NIPT discussed with patient: patient desires. Pre-test genetic counseling discussed and included:   Interpretation of family and medical histories to assess the probability of disease occurrence or recurrence;   Education about inheritance, genetic testing, disease management, prevention and resources  Counseling to promote informed choices and adaptation to the risk or presented of a genetic condition  Counseling for psychological aspects of genetic testing.  - Dating ultrasound ordered  - Return in 4 weeks for routine prenatal care          DEBORA Patrick-CNM       [1]   Past Medical History:  Diagnosis Date    Abnormal Pap smear of cervix     Disease of thyroid gland     Hashimoto's disease     PCOS (polycystic ovarian syndrome)    [2]   Past Surgical History:  Procedure Laterality Date    ADENOIDECTOMY       SECTION, LOW TRANSVERSE

## 2025-06-11 ENCOUNTER — HOSPITAL ENCOUNTER (OUTPATIENT)
Dept: RADIOLOGY | Facility: CLINIC | Age: 35
Discharge: HOME | End: 2025-06-11
Payer: COMMERCIAL

## 2025-06-11 ENCOUNTER — TELEPHONE (OUTPATIENT)
Dept: OBSTETRICS AND GYNECOLOGY | Facility: CLINIC | Age: 35
End: 2025-06-11

## 2025-06-11 ENCOUNTER — APPOINTMENT (OUTPATIENT)
Facility: CLINIC | Age: 35
End: 2025-06-11
Payer: COMMERCIAL

## 2025-06-11 DIAGNOSIS — Z34.81 PRENATAL CARE, SUBSEQUENT PREGNANCY IN FIRST TRIMESTER: ICD-10-CM

## 2025-06-11 PROCEDURE — 76801 OB US < 14 WKS SINGLE FETUS: CPT

## 2025-06-11 NOTE — TELEPHONE ENCOUNTER
Patient called ob line stating that her viability scan today showed she is further along, advised WESTON is now 1/13/2026.  Patient would like to know if she is able to get her Prequel genetic testing done sooner or is it still recommended she wait until her next ob visit for ob labs/prequel order?    Please advise

## 2025-06-12 ENCOUNTER — APPOINTMENT (OUTPATIENT)
Dept: OBSTETRICS AND GYNECOLOGY | Facility: CLINIC | Age: 35
End: 2025-06-12
Payer: COMMERCIAL

## 2025-06-12 DIAGNOSIS — Z34.90 PREGNANCY, UNSPECIFIED GESTATIONAL AGE (HHS-HCC): ICD-10-CM

## 2025-06-12 DIAGNOSIS — Z87.59 HISTORY OF GESTATIONAL HYPERTENSION: ICD-10-CM

## 2025-06-12 DIAGNOSIS — E03.9 HYPOTHYROIDISM, UNSPECIFIED TYPE: ICD-10-CM

## 2025-06-12 LAB
BACTERIA UR CULT: NORMAL
C TRACH RRNA SPEC QL NAA+PROBE: NOT DETECTED
N GONORRHOEA RRNA SPEC QL NAA+PROBE: NOT DETECTED
QUEST GC CT AMPLIFIED (ALWAYS MESSAGE): NORMAL
T VAGINALIS RRNA SPEC QL NAA+PROBE: NOT DETECTED

## 2025-06-12 NOTE — TELEPHONE ENCOUNTER
Pt does not want to wait until after lunch to  nips kit. Pt will pick it up at next appt on 7/9/25

## 2025-06-17 ENCOUNTER — LAB (OUTPATIENT)
Dept: LAB | Facility: HOSPITAL | Age: 35
End: 2025-06-17
Payer: COMMERCIAL

## 2025-06-17 LAB
ABO GROUP (TYPE) IN BLOOD: NORMAL
ANTIBODY SCREEN: NORMAL
ERYTHROCYTE [DISTWIDTH] IN BLOOD BY AUTOMATED COUNT: 13.8 % (ref 11.5–14.5)
HCT VFR BLD AUTO: 38.6 % (ref 36–46)
HGB BLD-MCNC: 12.6 G/DL (ref 12–16)
MCH RBC QN AUTO: 27.1 PG (ref 26–34)
MCHC RBC AUTO-ENTMCNC: 32.6 G/DL (ref 32–36)
MCV RBC AUTO: 83 FL (ref 80–100)
NRBC BLD-RTO: 0 /100 WBCS (ref 0–0)
PLATELET # BLD AUTO: 276 X10*3/UL (ref 150–450)
RBC # BLD AUTO: 4.65 X10*6/UL (ref 4–5.2)
REFLEX ADDED, ANEMIA PANEL: NORMAL
RH FACTOR (ANTIGEN D): NORMAL
WBC # BLD AUTO: 7.2 X10*3/UL (ref 4.4–11.3)

## 2025-06-17 PROCEDURE — 85027 COMPLETE CBC AUTOMATED: CPT

## 2025-06-17 PROCEDURE — 86850 RBC ANTIBODY SCREEN: CPT

## 2025-06-17 PROCEDURE — 86900 BLOOD TYPING SEROLOGIC ABO: CPT

## 2025-06-17 PROCEDURE — 86901 BLOOD TYPING SEROLOGIC RH(D): CPT

## 2025-06-20 LAB
ALBUMIN SERPL-MCNC: 4.3 G/DL (ref 3.6–5.1)
ALP SERPL-CCNC: 61 U/L (ref 31–125)
ALT SERPL-CCNC: 17 U/L (ref 6–29)
ANION GAP SERPL CALCULATED.4IONS-SCNC: 9 MMOL/L (CALC) (ref 7–17)
AST SERPL-CCNC: 21 U/L (ref 10–30)
BILIRUB SERPL-MCNC: 0.4 MG/DL (ref 0.2–1.2)
BUN SERPL-MCNC: 13 MG/DL (ref 7–25)
CALCIUM SERPL-MCNC: 9.8 MG/DL (ref 8.6–10.2)
CHLORIDE SERPL-SCNC: 104 MMOL/L (ref 98–110)
CO2 SERPL-SCNC: 21 MMOL/L (ref 20–32)
CREAT SERPL-MCNC: 0.57 MG/DL (ref 0.5–0.97)
CREAT UR-MCNC: 29 MG/DL (ref 20–275)
EGFRCR SERPLBLD CKD-EPI 2021: 124 ML/MIN/1.73M2
GLUCOSE SERPL-MCNC: 84 MG/DL (ref 65–139)
HBV SURFACE AG SERPL QL IA: NORMAL
HCV AB SERPL QL IA: NORMAL
HIV 1+2 AB+HIV1 P24 AG SERPL QL IA: NORMAL
LDH SERPL P TO L-CCNC: 185 U/L (ref 100–200)
POTASSIUM SERPL-SCNC: 4.2 MMOL/L (ref 3.5–5.3)
PROT SERPL-MCNC: 7 G/DL (ref 6.1–8.1)
PROT UR-MCNC: <4 MG/DL (ref 5–24)
PROT/CREAT UR: ABNORMAL MG/G CREAT (ref 24–184)
PROT/CREAT UR: ABNORMAL MG/MG CREAT (ref 0.02–0.18)
RUBV IGG SERPL IA-ACNC: 6.07 INDEX
SODIUM SERPL-SCNC: 134 MMOL/L (ref 135–146)
T PALLIDUM AB SER QL IA: NEGATIVE
TSH SERPL-ACNC: 1.69 MIU/L
URATE SERPL-MCNC: 3.1 MG/DL (ref 2.5–7)

## 2025-07-01 ENCOUNTER — OFFICE VISIT (OUTPATIENT)
Dept: URGENT CARE | Age: 35
End: 2025-07-01
Payer: COMMERCIAL

## 2025-07-01 VITALS
HEART RATE: 81 BPM | OXYGEN SATURATION: 98 % | HEIGHT: 64 IN | SYSTOLIC BLOOD PRESSURE: 114 MMHG | RESPIRATION RATE: 16 BRPM | WEIGHT: 220 LBS | DIASTOLIC BLOOD PRESSURE: 56 MMHG | BODY MASS INDEX: 37.56 KG/M2 | TEMPERATURE: 98.1 F

## 2025-07-01 DIAGNOSIS — H66.015 RECURRENT ACUTE SUPPURATIVE OTITIS MEDIA WITH SPONTANEOUS RUPTURE OF LEFT TYMPANIC MEMBRANE: Primary | ICD-10-CM

## 2025-07-01 RX ORDER — AMOXICILLIN AND CLAVULANATE POTASSIUM 875; 125 MG/1; MG/1
875 TABLET, FILM COATED ORAL 2 TIMES DAILY
Qty: 20 TABLET | Refills: 0 | Status: SHIPPED | OUTPATIENT
Start: 2025-07-01

## 2025-07-01 ASSESSMENT — PATIENT HEALTH QUESTIONNAIRE - PHQ9
SUM OF ALL RESPONSES TO PHQ9 QUESTIONS 1 AND 2: 0
2. FEELING DOWN, DEPRESSED OR HOPELESS: NOT AT ALL
1. LITTLE INTEREST OR PLEASURE IN DOING THINGS: NOT AT ALL

## 2025-07-01 NOTE — PROGRESS NOTES
"Subjective   Patient ID: Liliam Vargas is a 34 y.o. female. They present today with a chief complaint of Illness (Ear pain - Entered by patient) and LEFT EAR PAIN (Hx of left ear pain x 2 months.. pt seen her 6/1/25. Pt  has ENT  appt July 17 for follow up. Severe pain started again today and pt does not want to wait until ENT appt. Pt is pregnant).      History of Present Illness  Ear Pain  Patient complains of ear pain and possible ear infection. Symptoms include left ear pain. Onset of symptoms was a few months ago, and have been waxing and waning since that time. Associated symptoms include: none. Patient denies: chills and fever . She is drinking moderate amounts of fluids. Pt is currently pregnant. She has been to see her PCP and ENT; has a F/U appt with ENT in a few weeks.          History provided by:  Patient, medical records and spouse  Illness        Past Medical History  Allergies as of 07/01/2025    (No Known Allergies)       Prescriptions Prior to Admission[1]     Current Medications[2]    Problem List[3]    Surgical History[4]     reports that she has never smoked. She has never used smokeless tobacco. She reports that she does not currently use alcohol. She reports that she does not use drugs.    Review of Systems  As noted in HPI. ROS otherwise negative unless noted.       Objective    Vitals:    07/01/25 1346   BP: 114/56   BP Location: Right arm   Patient Position: Sitting   BP Cuff Size: Large adult   Pulse: 81   Resp: 16   Temp: 36.7 °C (98.1 °F)   TempSrc: Oral   SpO2: 98%   Weight: 99.8 kg (220 lb)   Height: 1.626 m (5' 4\")     Patient's last menstrual period was 04/13/2025 (approximate).    Physical Exam  Vitals and nursing note reviewed.   Constitutional:       General: She is not in acute distress.     Appearance: Normal appearance. She is not ill-appearing.   HENT:      Head: Normocephalic and atraumatic.      Right Ear: A middle ear effusion is present.      Left Ear: Tympanic membrane " is erythematous.      Ears:      Comments: Purulent otorrhea covering about 25% of TM  Cardiovascular:      Rate and Rhythm: Normal rate and regular rhythm.   Pulmonary:      Effort: Pulmonary effort is normal.      Breath sounds: Normal breath sounds.   Abdominal:      General: Bowel sounds are normal.      Palpations: Abdomen is soft.   Musculoskeletal:      Cervical back: Normal range of motion and neck supple.   Skin:     General: Skin is warm and dry.      Capillary Refill: Capillary refill takes less than 2 seconds.   Neurological:      Mental Status: She is alert and oriented to person, place, and time.   Psychiatric:         Behavior: Behavior normal.           Procedures    Point of Care Test & Imaging Results from this visit  Results for orders placed or performed in visit on 06/12/25   HIV 1/2 Antigen/Antibody Screen with Reflex to Confirmation    Collection Time: 06/17/25  9:26 AM   Result Value Ref Range    HIV AG/AB, 4TH GEN NON-REACTIVE NON-REACTIVE   Syphilis Screen with Reflex    Collection Time: 06/17/25  9:26 AM   Result Value Ref Range    T. PALLIDUM AB Negative Negative   Rubella Antibody, IgG    Collection Time: 06/17/25  9:26 AM   Result Value Ref Range    RUBELLA AB (IGG), IMMUNE STATUS 6.07 Index   Hepatitis B Surface Antigen    Collection Time: 06/17/25  9:26 AM   Result Value Ref Range    HEPATITIS B SURFACE ANTIGEN NON-REACTIVE NON-REACTIVE   Hepatitis C Antibody    Collection Time: 06/17/25  9:26 AM   Result Value Ref Range    HEPATITIS C ANTIBODY NON-REACTIVE NON-REACTIVE   Lactate Dehydrogenase    Collection Time: 06/17/25  9:26 AM   Result Value Ref Range     100 - 200 U/L   Uric Acid    Collection Time: 06/17/25  9:26 AM   Result Value Ref Range    URIC ACID 3.1 2.5 - 7.0 mg/dL   Protein, Urine Random    Collection Time: 06/17/25  9:26 AM   Result Value Ref Range    CREATININE, RANDOM URINE 29 20 - 275 mg/dL    PROTEIN/CREATININE RATIO NOTE 24 - 184 mg/g creat     PROTEIN/CREATININE RATIO NOTE 0.024 - 0.184 mg/mg creat    PROTEIN, TOTAL, RANDOM UR <4 (L) 5 - 24 mg/dL   Comprehensive Metabolic Panel    Collection Time: 06/17/25  9:26 AM   Result Value Ref Range    GLUCOSE 84 65 - 139 mg/dL    UREA NITROGEN (BUN) 13 7 - 25 mg/dL    CREATININE 0.57 0.50 - 0.97 mg/dL    EGFR 124 > OR = 60 mL/min/1.73m2    SODIUM 134 (L) 135 - 146 mmol/L    POTASSIUM 4.2 3.5 - 5.3 mmol/L    CHLORIDE 104 98 - 110 mmol/L    CARBON DIOXIDE 21 20 - 32 mmol/L    ELECTROLYTE BALANCE 9 7 - 17 mmol/L (calc)    CALCIUM 9.8 8.6 - 10.2 mg/dL    PROTEIN, TOTAL 7.0 6.1 - 8.1 g/dL    ALBUMIN 4.3 3.6 - 5.1 g/dL    BILIRUBIN, TOTAL 0.4 0.2 - 1.2 mg/dL    ALKALINE PHOSPHATASE 61 31 - 125 U/L    AST 21 10 - 30 U/L    ALT 17 6 - 29 U/L   TSH with reflex to Free T4 if abnormal    Collection Time: 06/17/25  9:26 AM   Result Value Ref Range    TSH W/REFLEX TO FT4 1.69 mIU/L   Type And Screen Is this order related to pregnancy or an upcoming surgery? Yes; [place] [facility] [place] What is the date of the surgery? 1/13/2026; Has this patient ever had a transfusion? Unknown; H...    Collection Time: 06/17/25  9:27 AM   Result Value Ref Range    ABO TYPE O     Rh TYPE POS     ANTIBODY SCREEN NEG    CBC    Collection Time: 06/17/25  9:27 AM   Result Value Ref Range    WBC 7.2 4.4 - 11.3 x10*3/uL    nRBC 0.0 0.0 - 0.0 /100 WBCs    RBC 4.65 4.00 - 5.20 x10*6/uL    Hemoglobin 12.6 12.0 - 16.0 g/dL    Hematocrit 38.6 36.0 - 46.0 %    MCV 83 80 - 100 fL    MCH 27.1 26.0 - 34.0 pg    MCHC 32.6 32.0 - 36.0 g/dL    RDW 13.8 11.5 - 14.5 %    Platelets 276 150 - 450 x10*3/uL   CBC Anemia Panel with Reflex, Pregnancy    Collection Time: 06/17/25  9:27 AM   Result Value Ref Range    Reflex added, Anemia panel No reflex.             Diagnostic study results (if any) were reviewed.  (If applicable) preliminary radiology reading: [none]    Assessment/Plan   Allergies, medications, history, and  pertinent labs/EKGs/Imaging reviewed.        Medical Decision Making  Risks, benefits, and alternatives of the medications and treatment plan prescribed today were discussed, and patient expressed understanding. Plan follow up as discussed or as needed if any worsening symptoms or change in condition. Reinforced red flags including (but not limited to): severe or worsening pain; difficulty swallowing; stiff neck; shortness of breath; coughing or vomiting blood; chest pain; and new or increased fever are indications to go to the Emergency Department.  At time of discharge patient was clinically well-appearing and HDS for outpatient management. The patient and/or family was educated regarding diagnosis, supportive care, OTC and Rx medications. The patient and/or family was given the opportunity to ask questions prior to discharge.  They verbalized understanding of my discussion of the plans for treatment, expected course, indications to return to  or seek further evaluation in ED, and the need for timely follow up as directed.   They were provided with a work/school excuse if requested. The after-visit summary was given to the patient and care instructions were reviewed with the patient. All questions were answered and the patient verbalized understanding of the plan of care for today.  Plan:  Recent visit notes reviewed  Meds as above  Increase clear fluids  Pcp follow up this week if not improving or worsening  ER visit anytime 24/7 for acute worsening or changing condition    Orders and Diagnoses  Diagnoses and all orders for this visit:  Recurrent acute suppurative otitis media with spontaneous rupture of left tympanic membrane      Medical Admin Record      Follow Up Instructions  No follow-ups on file.    Patient disposition: Home  DELVIS Patel           [1] (Not in a hospital admission)   [2]   Current Outpatient Medications   Medication Sig Dispense Refill    azelaic acid (Finacea) 15 % gel  Apply at night to areas of rosacea 60 g 11    cholecalciferol (Vitamin D-3) 50 mcg (2,000 units) tablet Take 1 tablet (50 mcg) by mouth once daily.      ciprofloxacin-dexamethasone (CiproDEX) otic suspension USE 4 DROPS IN THE LEFT EAR TWO TIMES A DAY FOR 10 DAYS. (Patient not taking: Reported on 2025)      ergocalciferol (Vitamin D2) 1250 mcg (50,000 units) capsule 1 po weekly for 8 weeks. After completing take otc vitamin D 2000 units a day. (Patient not taking: Reported on 2025) 8 capsule 0    metroNIDAZOLE (Metrogel) 0.75 % gel Apply qam to rosacea 45 g 11    PRENATAL 2-IRON-FOLIC ACID-OM3 ORAL Take by mouth.      Synthroid 125 mcg tablet Take 1 tablet (125 mcg) by mouth early in the morning.. Take on an empty stomach at the same time each day, either 30 to 60 minutes prior to breakfast 90 tablet 3     No current facility-administered medications for this visit.   [3]   Patient Active Problem List  Diagnosis    Hypothyroidism    PCOS (polycystic ovarian syndrome)    Goiter    High alkaline phosphatase    Vitamin D deficiency    Class 2 obesity with body mass index (BMI) of 38.0 to 38.9 in adult    Previous  section complicating pregnancy ([facility])    Obesity affecting pregnancy in first trimester ([facility])    Less than 8 weeks gestation of pregnancy ([facility])    History of gestational hypertension   [4]   Past Surgical History:  Procedure Laterality Date    ADENOIDECTOMY       SECTION, LOW TRANSVERSE

## 2025-07-02 ENCOUNTER — HOSPITAL ENCOUNTER (EMERGENCY)
Facility: HOSPITAL | Age: 35
Discharge: HOME | End: 2025-07-02
Attending: STUDENT IN AN ORGANIZED HEALTH CARE EDUCATION/TRAINING PROGRAM
Payer: COMMERCIAL

## 2025-07-02 VITALS
HEART RATE: 73 BPM | OXYGEN SATURATION: 98 % | RESPIRATION RATE: 18 BRPM | DIASTOLIC BLOOD PRESSURE: 66 MMHG | WEIGHT: 220 LBS | SYSTOLIC BLOOD PRESSURE: 128 MMHG | HEIGHT: 64 IN | TEMPERATURE: 97.9 F | BODY MASS INDEX: 37.56 KG/M2

## 2025-07-02 DIAGNOSIS — H60.312 ACUTE DIFFUSE OTITIS EXTERNA OF LEFT EAR: ICD-10-CM

## 2025-07-02 DIAGNOSIS — H66.016 RECURRENT ACUTE SUPPURATIVE OTITIS MEDIA WITH SPONTANEOUS RUPTURE OF BOTH TYMPANIC MEMBRANES: Primary | ICD-10-CM

## 2025-07-02 PROCEDURE — 2500000001 HC RX 250 WO HCPCS SELF ADMINISTERED DRUGS (ALT 637 FOR MEDICARE OP): Performed by: EMERGENCY MEDICINE

## 2025-07-02 PROCEDURE — 99284 EMERGENCY DEPT VISIT MOD MDM: CPT | Performed by: STUDENT IN AN ORGANIZED HEALTH CARE EDUCATION/TRAINING PROGRAM

## 2025-07-02 PROCEDURE — 99283 EMERGENCY DEPT VISIT LOW MDM: CPT | Performed by: STUDENT IN AN ORGANIZED HEALTH CARE EDUCATION/TRAINING PROGRAM

## 2025-07-02 RX ORDER — CIPROFLOXACIN AND DEXAMETHASONE 3; 1 MG/ML; MG/ML
4 SUSPENSION/ DROPS AURICULAR (OTIC) 2 TIMES DAILY
Qty: 7.5 ML | Refills: 0 | Status: SHIPPED | OUTPATIENT
Start: 2025-07-02 | End: 2025-07-09

## 2025-07-02 RX ORDER — CEFDINIR 300 MG/1
300 CAPSULE ORAL 2 TIMES DAILY
Qty: 19 CAPSULE | Refills: 0 | Status: SHIPPED | OUTPATIENT
Start: 2025-07-02 | End: 2025-07-12

## 2025-07-02 RX ORDER — CEFDINIR 300 MG/1
300 CAPSULE ORAL ONCE
Status: COMPLETED | OUTPATIENT
Start: 2025-07-02 | End: 2025-07-02

## 2025-07-02 RX ADMIN — CEFDINIR 300 MG: 300 CAPSULE ORAL at 21:48

## 2025-07-02 ASSESSMENT — PAIN DESCRIPTION - ORIENTATION: ORIENTATION: LEFT

## 2025-07-02 ASSESSMENT — PAIN DESCRIPTION - PAIN TYPE: TYPE: ACUTE PAIN

## 2025-07-02 ASSESSMENT — PAIN DESCRIPTION - PROGRESSION: CLINICAL_PROGRESSION: GRADUALLY WORSENING

## 2025-07-02 ASSESSMENT — PAIN SCALES - GENERAL: PAINLEVEL_OUTOF10: 8

## 2025-07-02 ASSESSMENT — PAIN - FUNCTIONAL ASSESSMENT: PAIN_FUNCTIONAL_ASSESSMENT: 0-10

## 2025-07-02 ASSESSMENT — PAIN DESCRIPTION - LOCATION: LOCATION: EAR

## 2025-07-02 ASSESSMENT — PAIN DESCRIPTION - FREQUENCY: FREQUENCY: CONSTANT/CONTINUOUS

## 2025-07-02 ASSESSMENT — PAIN DESCRIPTION - ONSET: ONSET: ONGOING

## 2025-07-03 NOTE — DISCHARGE INSTRUCTIONS
Please follow-up with your ENT.  Please take cefuroxime twice daily for 10 days, do not stop early, if you miss a dose, do not double dose, just extend your course by half a day.  Please also take your Ciprodex for your left ear.    Please return to the ER or seek immediate medical attention if you experience fever of 100.4 (38C) or higher that does not respond to acetaminophen or ibuprofen, persistent vomiting, inability to open your jaw, hot potato voice, stridor, difficulty with physically swallowing, difficulty breathing, chest pain, or worsening of your current symptoms    You are welcome back any time. Thank you for entrusting your care to us, I hope we made your visit as pleasant as possible. Wishing you well!    Dr. Forde

## 2025-07-03 NOTE — ED PROVIDER NOTES
EMERGENCY DEPARTMENT ENCOUNTER      Pt Name: Liliam Vargas  MRN: [id number]  Birthdate 1990  Date of evaluation: 7/2/2025  Provider: Jonathan Forde DO    CHIEF COMPLAINT       Chief Complaint   Patient presents with    Earache     Pt c/o left ear pain that radiates up to head and down neck x2 days      HISTORY OF PRESENT ILLNESS    Liliam Vargas is a 34 y.o. year old female who presents to the ER for recurrent left ear pain.  Reports that 1 month ago she had started to have left ear pain, went to urgent care, was started on amoxicillin which she took for 2 to 3 days before switching to Augmentin at the direction of her primary care provider.  After 4 days of Augmentin she saw ENT who told her to stop the Augmentin and started her on Ciprodex.  She did finish her Ciprodex after 7 days.  3 to 4 days ago, the pain recurred, worsened yesterday morning.  She does also endorse hearing loss throughout this process.    Denies fevers, nausea or vomiting, rhinorrhea, cough, sore throat or difficulty swallowing, chest pain, shortness of breath, palpitations, changes to urine or stool.  PMH Hashimoto's, PCOS     PAST MEDICAL HISTORY   Medical History[1]  CURRENT MEDICATIONS       Previous Medications    AMOXICILLIN-CLAVULANATE (AUGMENTIN) 875-125 MG TABLET    Take 1 tablet (875 mg of amoxicillin) by mouth 2 times a day.    AZELAIC ACID (FINACEA) 15 % GEL    Apply at night to areas of rosacea    CHOLECALCIFEROL (VITAMIN D-3) 50 MCG (2,000 UNITS) TABLET    Take 1 tablet (50 mcg) by mouth once daily.    CIPROFLOXACIN-DEXAMETHASONE (CIPRODEX) OTIC SUSPENSION    USE 4 DROPS IN THE LEFT EAR TWO TIMES A DAY FOR 10 DAYS.    ERGOCALCIFEROL (VITAMIN D2) 1250 MCG (50,000 UNITS) CAPSULE    1 po weekly for 8 weeks. After completing take otc vitamin D 2000 units a day.    METRONIDAZOLE (METROGEL) 0.75 % GEL    Apply qam to rosacea    PRENATAL 2-IRON-FOLIC ACID-OM3 ORAL    Take by mouth.    SYNTHROID 125 MCG TABLET    Take 1 tablet  (125 mcg) by mouth early in the morning.. Take on an empty stomach at the same time each day, either 30 to 60 minutes prior to breakfast     SURGICAL HISTORY     Surgical History[2]  ALLERGIES     Patient has no known allergies.  FAMILY HISTORY     Family History[3]  SOCIAL HISTORY     Social History[4]  PHYSICAL EXAM  (up to 7 for level 4, 8 or more for level 5)     ED Triage Vitals [07/02/25 2024]   Temperature Heart Rate Respirations BP   36.6 °C (97.9 °F) 80 16 139/83      Pulse Ox Temp Source Heart Rate Source Patient Position   98 % Temporal Monitor Sitting      BP Location FiO2 (%)     Right arm --       Physical Exam  Vitals and nursing note reviewed.   Constitutional:       General: She is not in acute distress.     Appearance: Normal appearance. She is not ill-appearing.   HENT:      Head: Normocephalic and atraumatic. No raccoon eyes, Sanchez's sign, contusion or laceration.      Jaw: No trismus or malocclusion.      Right Ear: External ear normal. A middle ear effusion is present. No mastoid tenderness. Tympanic membrane is injected, erythematous and bulging. Tympanic membrane is not perforated.      Left Ear: Decreased hearing noted. Swelling and tenderness present. A middle ear effusion is present. No foreign body. There is mastoid tenderness. Tympanic membrane is injected, erythematous and bulging. Tympanic membrane is not perforated.      Ears:      Comments: L Retroauricular TTP     Mouth/Throat:      Mouth: Mucous membranes are moist.      Pharynx: Oropharynx is clear.   Eyes:      Extraocular Movements: Extraocular movements intact.      Pupils: Pupils are equal, round, and reactive to light.   Neck:      Trachea: No tracheal deviation.   Cardiovascular:      Rate and Rhythm: Normal rate and regular rhythm.      Pulses: Normal pulses.   Pulmonary:      Effort: No respiratory distress.      Breath sounds: No wheezing, rhonchi or rales.   Chest:      Chest wall: No tenderness.   Abdominal:       "General: Abdomen is flat.      Palpations: Abdomen is soft. There is no mass.      Tenderness: There is no abdominal tenderness.   Musculoskeletal:         General: No tenderness or signs of injury.      Right lower leg: No edema.      Left lower leg: No edema.   Skin:     Coloration: Skin is not jaundiced or pale.      Findings: No petechiae, rash or wound.   Neurological:      Mental Status: She is alert.      Sensory: No sensory deficit.      Motor: No weakness.        DIAGNOSTIC RESULTS   LABS:  Labs Reviewed - No data to display  All other labs were within normal range or not returned as of this dictation.  Imaging  No orders to display      Procedure  Procedures  EMERGENCY DEPARTMENT COURSE/MDM:   Medical Decision Making    Vitals:    Vitals:    07/02/25 2024   BP: 139/83   BP Location: Right arm   Patient Position: Sitting   Pulse: 80   Resp: 16   Temp: 36.6 °C (97.9 °F)   TempSrc: Temporal   SpO2: 98%   Weight: 99.8 kg (220 lb)   Height: 1.626 m (5' 4\")     Liliam Vargas is a female 34 y.o. who presents to the ER for recurrent left ear pain in the setting of recent amoxicillin, Augmentin, and Ciprodex use. On arrival the patients vital signs were: Afebrile, regular heart rate, normotensive, regular respiration rate, normoxic on room air. History obtained from: patient.  Given left ear tenderness, pain with movement, canal swelling, I do suspect she has recurrent otitis externa, however given purulent bulging injected TMs bilaterally, I suspect she also has otitis media.  Given she has already tried amoxicillin and Augmentin, will provide Omnicef for otitis media coverage.    Diagnoses as of 07/02/25 2130   Recurrent acute suppurative otitis media with spontaneous rupture of both tympanic membranes   Acute diffuse otitis externa of left ear     Diagnostic testing considered but not performed: No fevers, vomiting, history of diabetes, I do not suspect malignant otitis or abscess at this time, CT IAC " deferred.  External Records Reviewed: I reviewed recent and relevant outside records including inpatient notes, outpatient records    Shared decision making for disposition  Patient and/or patient´s representative was counseled regarding labs, imaging, likely diagnosis. All questions were answered. Recommendation was made   for discharge home. The patient agreed and was discharged home in stable condition with appropriate relevant educational materials. Return precautions were provided which included fever of 100.4 (38C) or higher that does not respond to acetaminophen or ibuprofen, persistent vomiting, inability to open your jaw, hot potato voice, stridor, difficulty with physically swallowing, difficulty breathing, chest pain, or worsening of your current symptoms.     ED Medications administered this visit:    Medications   cefdinir (Omnicef) capsule 300 mg (has no administration in time range)       New Prescriptions from this visit:    New Prescriptions    CEFDINIR (OMNICEF) 300 MG CAPSULE    Take 1 capsule (300 mg) by mouth 2 times a day for 10 days.       Follow-up: Patient instructed to follow-up with her recent ENT.     Final Impression:   1. Recurrent acute suppurative otitis media with spontaneous rupture of both tympanic membranes    2. Acute diffuse otitis externa of left ear          Please excuse any misspellings or unintended errors related to the Dragon speech recognition software used to dictate this note.    I reviewed the case with the attending ED physician. The attending ED physician agrees with the plan.          [1]   Past Medical History:  Diagnosis Date    Abnormal Pap smear of cervix     Disease of thyroid gland     Hashimoto's disease     PCOS (polycystic ovarian syndrome)    [2]   Past Surgical History:  Procedure Laterality Date    ADENOIDECTOMY       SECTION, LOW TRANSVERSE     [3]   Family History  Problem Relation Name Age of Onset    Goiter Mother Homa     Hypertension  Mother Homa     Hyperlipidemia Mother Homa     Hyperlipidemia Father Faustino     Hypertension Father Faustino     Goiter Sister      Diabetes Brother Faustino     Diabetes Maternal Grandmother Kathy     Diabetes Maternal Grandfather Mariono     Diabetes Paternal Grandmother Tari    [4]   Social History  Tobacco Use    Smoking status: Never    Smokeless tobacco: Never   Vaping Use    Vaping status: Never Used   Substance Use Topics    Alcohol use: Not Currently    Drug use: Never        Jonathan Forde DO  Resident  07/02/25 [id number]

## 2025-07-08 ENCOUNTER — TELEPHONE (OUTPATIENT)
Dept: OBSTETRICS AND GYNECOLOGY | Facility: CLINIC | Age: 35
End: 2025-07-08
Payer: COMMERCIAL

## 2025-07-08 LAB — COMMENTS - MP RESULT TYPE: NORMAL

## 2025-07-08 NOTE — TELEPHONE ENCOUNTER
13.0 week ob calling for myriad results, Per KAILASH Alejandro it was okay to tell her that her results were negative and she was having a girl! Patient was advised and very happy.

## 2025-07-09 ENCOUNTER — APPOINTMENT (OUTPATIENT)
Dept: OBSTETRICS AND GYNECOLOGY | Facility: CLINIC | Age: 35
End: 2025-07-09
Payer: COMMERCIAL

## 2025-07-09 VITALS — DIASTOLIC BLOOD PRESSURE: 75 MMHG | BODY MASS INDEX: 37.97 KG/M2 | WEIGHT: 221.2 LBS | SYSTOLIC BLOOD PRESSURE: 116 MMHG

## 2025-07-09 DIAGNOSIS — E66.89 OTHER OBESITY AFFECTING PREGNANCY IN FIRST TRIMESTER: ICD-10-CM

## 2025-07-09 DIAGNOSIS — O99.211 OTHER OBESITY AFFECTING PREGNANCY IN FIRST TRIMESTER: ICD-10-CM

## 2025-07-09 DIAGNOSIS — Z3A.13 13 WEEKS GESTATION OF PREGNANCY (HHS-HCC): Primary | ICD-10-CM

## 2025-07-09 DIAGNOSIS — Z87.59 HISTORY OF GESTATIONAL HYPERTENSION: ICD-10-CM

## 2025-07-09 DIAGNOSIS — E03.9 HYPOTHYROIDISM, UNSPECIFIED TYPE: ICD-10-CM

## 2025-07-09 PROCEDURE — 0501F PRENATAL FLOW SHEET: CPT | Performed by: ADVANCED PRACTICE MIDWIFE

## 2025-07-09 NOTE — PROGRESS NOTES
Ob Visit  25     SUBJECTIVE    HPI: Liliam Vargas is a 34 y.o.  at 13w1d here for Return OB visit.  Liliam reports feeling well, concerns about losing hair, feels like it's more than normal. TSH reviewed, WNLs.  Endorses some mild cramping but no bleeding.  Seeing ENT for ear infx/stuffiness tomorrow  NT scan scheduled   OB labs reviewed, WNLs  RR cfDNA, found out they are having a girl, 2 boys at home.   Briefly discussed PP contraception, considering BTL at 6 weeks.       OBJECTIVE  Visit Vitals  /75   Wt 100 kg (221 lb 3.2 oz)   LMP 2025 (Approximate)   BMI 37.97 kg/m²   OB Status Pregnant   Smoking Status Never   BSA 2.13 m²            ASSESSMENT & PLAN    Liliam Vargas is a 34 y.o.  at 13w1d here for the following concerns we addressed today:    Problem List Items Addressed This Visit          Ob-Gyn Problems    Obesity affecting pregnancy in first trimester ([facility])    Overview   Pregravid BMI: 36.89  Weekly NSTs 37 weeks  Growth U/S at 30 & 36 weeks  IOL 39.0-39.6         History of gestational hypertension    Overview   Recommend starting ASA at 12 weeks, written literature provided for pt to consider  Baseline HELLP labs with OB labs         13 weeks gestation of pregnancy ([facility]) - Primary    Overview   Desired provider in labor: [x] CNM  [] Physician   [] Either Acceptable  [x] Blood Products: [x] Yes, accepts [] No, needs counseling  [x] Initial BMI: 36.89   [x] Prenatal Labs: WNLs  [x] Cervical Cancer Screening up to date: Plan to collect at 6 wk PPV  [x] Rh status: positive  [x] Screen for IPV and Substance Use Risk: Feels safe  [x] Genetic Screening (cfDNA):  RR cfDNA - GIRL!  [] First Trimester Anatomy Screen (11-13.6 wks):  [] Baby ASA (initiated):  [x] Pregnancy dated by: 9 wk U/S    [] Anatomy US: (19-20 wks)  [] Federal Sterilization consent signed (if indicated):  [] 1hr GCT at 24-28wks:  [] Rhogam (if indicated):   [] Fetal Surveillance (if  indicated):  [] Tdap (27-32 wks, may be given up to 36 wks if initial window missed):   [] RSV (32-36 wks) (Sept. to end of Jan):     [] Feeding Intentions:  [] Postpartum Birth control method:   [] GBS at 36 - 37 wks:  [] 39 weeks discussion of IOL vs. Expectant management:  [] Mode of delivery ( anticipated ):              Other    Hypothyroidism    Overview   [facility] [place], endocrinologist Dr Ana Fine  TSH drawn 6/3: 4.36  Synthroid increased at NOB visit to 125 mcg  TSH with NOB labs at 12 weeks  TSH every trimester  TSH 12 weeks: 1.69  TSH 24-28 weeks:  TSH 32-36 weeks:              Return to care in 4 weeks or sooner as needed      KAILASH Patrick

## 2025-07-11 ENCOUNTER — HOSPITAL ENCOUNTER (OUTPATIENT)
Dept: RADIOLOGY | Facility: CLINIC | Age: 35
Discharge: HOME | End: 2025-07-11
Payer: COMMERCIAL

## 2025-07-11 DIAGNOSIS — Z34.81 PRENATAL CARE, SUBSEQUENT PREGNANCY IN FIRST TRIMESTER: ICD-10-CM

## 2025-07-11 PROCEDURE — 76816 OB US FOLLOW-UP PER FETUS: CPT

## 2025-07-11 PROCEDURE — 76813 OB US NUCHAL MEAS 1 GEST: CPT

## 2025-07-29 DIAGNOSIS — L71.9 ROSACEA: ICD-10-CM

## 2025-07-29 RX ORDER — AZELAIC ACID 0.15 G/G
GEL TOPICAL
Qty: 50 G | Refills: 11 | Status: SHIPPED | OUTPATIENT
Start: 2025-07-29

## 2025-08-07 ENCOUNTER — APPOINTMENT (OUTPATIENT)
Dept: OBSTETRICS AND GYNECOLOGY | Facility: CLINIC | Age: 35
End: 2025-08-07
Payer: COMMERCIAL

## 2025-08-07 VITALS — BODY MASS INDEX: 38.9 KG/M2 | SYSTOLIC BLOOD PRESSURE: 111 MMHG | WEIGHT: 226.6 LBS | DIASTOLIC BLOOD PRESSURE: 74 MMHG

## 2025-08-07 DIAGNOSIS — O34.219 PREVIOUS CESAREAN SECTION COMPLICATING PREGNANCY (HHS-HCC): ICD-10-CM

## 2025-08-07 DIAGNOSIS — O99.212 OBESITY AFFECTING PREGNANCY IN SECOND TRIMESTER, UNSPECIFIED OBESITY TYPE (HHS-HCC): ICD-10-CM

## 2025-08-07 DIAGNOSIS — Z3A.17 17 WEEKS GESTATION OF PREGNANCY (HHS-HCC): Primary | ICD-10-CM

## 2025-08-07 DIAGNOSIS — E03.8 OTHER SPECIFIED HYPOTHYROIDISM: ICD-10-CM

## 2025-08-07 DIAGNOSIS — Z87.59 HISTORY OF GESTATIONAL HYPERTENSION: ICD-10-CM

## 2025-08-07 DIAGNOSIS — E28.2 PCOS (POLYCYSTIC OVARIAN SYNDROME): ICD-10-CM

## 2025-08-07 PROCEDURE — 0501F PRENATAL FLOW SHEET: CPT | Performed by: ADVANCED PRACTICE MIDWIFE

## 2025-08-13 PROBLEM — O99.212 OBESITY AFFECTING PREGNANCY IN SECOND TRIMESTER (HHS-HCC): Status: ACTIVE | Noted: 2025-06-10

## 2025-08-28 ENCOUNTER — HOSPITAL ENCOUNTER (OUTPATIENT)
Dept: RADIOLOGY | Facility: CLINIC | Age: 35
Discharge: HOME | End: 2025-08-28
Payer: COMMERCIAL

## 2025-08-28 DIAGNOSIS — Z34.81 PRENATAL CARE, SUBSEQUENT PREGNANCY IN FIRST TRIMESTER: ICD-10-CM

## 2025-08-28 PROCEDURE — 76811 OB US DETAILED SNGL FETUS: CPT

## 2025-09-01 PROBLEM — O09.899 TWO VESSEL UMBILICAL CORD IN SINGLETON PREGNANCY, ANTEPARTUM: Status: ACTIVE | Noted: 2025-09-01

## 2025-09-05 ENCOUNTER — APPOINTMENT (OUTPATIENT)
Dept: PODIATRY | Facility: CLINIC | Age: 35
End: 2025-09-05
Payer: COMMERCIAL

## 2025-09-10 ENCOUNTER — APPOINTMENT (OUTPATIENT)
Dept: OBSTETRICS AND GYNECOLOGY | Facility: CLINIC | Age: 35
End: 2025-09-10
Payer: COMMERCIAL

## 2025-10-08 ENCOUNTER — APPOINTMENT (OUTPATIENT)
Dept: OBSTETRICS AND GYNECOLOGY | Facility: CLINIC | Age: 35
End: 2025-10-08
Payer: COMMERCIAL

## 2025-10-30 ENCOUNTER — APPOINTMENT (OUTPATIENT)
Dept: OBSTETRICS AND GYNECOLOGY | Facility: CLINIC | Age: 35
End: 2025-10-30
Payer: COMMERCIAL